# Patient Record
Sex: FEMALE | Race: WHITE | NOT HISPANIC OR LATINO | Employment: FULL TIME | URBAN - METROPOLITAN AREA
[De-identification: names, ages, dates, MRNs, and addresses within clinical notes are randomized per-mention and may not be internally consistent; named-entity substitution may affect disease eponyms.]

---

## 2018-07-18 LAB
ABO + RH BLD: NORMAL
ABO + RH BLD: NORMAL
BLD GP AB SCN SERPL QL: NORMAL
C TRACH DNA SPEC QL PROBE+SIG AMP: NORMAL
CHLAMYDIA - HIM PATIENT REPORTED: NEGATIVE
HBV SURFACE AG SERPL QL IA: NORMAL
HIV 1+2 AB+HIV1 P24 AG SERPL QL IA: NORMAL
N GONORRHOEA DNA SPEC QL PROBE+SIG AMP: NORMAL
RUBELLA ANTIBODY IGG QUANTITATIVE: NORMAL IU/ML

## 2018-09-11 ENCOUNTER — TRANSFERRED RECORDS (OUTPATIENT)
Dept: HEALTH INFORMATION MANAGEMENT | Facility: CLINIC | Age: 38
End: 2018-09-11

## 2018-09-17 ENCOUNTER — PRE VISIT (OUTPATIENT)
Dept: MATERNAL FETAL MEDICINE | Facility: CLINIC | Age: 38
End: 2018-09-17

## 2018-09-24 ENCOUNTER — HOSPITAL ENCOUNTER (OUTPATIENT)
Dept: ULTRASOUND IMAGING | Facility: CLINIC | Age: 38
Discharge: HOME | End: 2018-09-24
Attending: OBSTETRICS & GYNECOLOGY | Admitting: OBSTETRICS & GYNECOLOGY
Payer: COMMERCIAL

## 2018-09-24 ENCOUNTER — OFFICE VISIT (OUTPATIENT)
Dept: MATERNAL FETAL MEDICINE | Facility: CLINIC | Age: 38
End: 2018-09-24
Attending: OBSTETRICS & GYNECOLOGY
Payer: COMMERCIAL

## 2018-09-24 DIAGNOSIS — O26.90 PREGNANCY RELATED CONDITION, ANTEPARTUM: ICD-10-CM

## 2018-09-24 DIAGNOSIS — O09.522 ELDERLY MULTIGRAVIDA IN SECOND TRIMESTER: Primary | ICD-10-CM

## 2018-09-24 DIAGNOSIS — O09.512 SUPERVISION OF ELDERLY PRIMIGRAVIDA IN SECOND TRIMESTER: Primary | ICD-10-CM

## 2018-09-24 PROCEDURE — 40000072 ZZH STATISTIC GENETIC COUNSELING, < 16 MIN: Mod: ZF | Performed by: GENETIC COUNSELOR, MS

## 2018-09-24 PROCEDURE — 76811 OB US DETAILED SNGL FETUS: CPT

## 2018-09-24 NOTE — MR AVS SNAPSHOT
"              After Visit Summary   2018    Bonnie Welch    MRN: [id number]           Patient Information     Date Of Birth          1980        Visit Information        Provider Department      2018 8:45 AM Mary Nichols [facility]        Today's Diagnoses     Supervision of elderly primigravida in second trimester    -  1    Pregnancy related condition, antepartum           Follow-ups after your visit        Who to contact     If you have questions or need follow up information about today's clinic visit or your schedule please contact [facility]ERNAL FETAL [facility]IC[facility] directly at [phone].  Normal or non-critical lab and imaging results will be communicated to you by [employer], letter or phone within 4 business days after the clinic has received the results. If you do not hear from us within 7 days, please contact the clinic through [employer] or phone. If you have a critical or abnormal lab result, we will notify you by phone as soon as possible.  Submit refill requests through tripJane or call your pharmacy and they will forward the refill request to us. Please allow 3 business days for your refill to be completed.          Additional Information About Your Visit        MyChart Information     tripJane lets you send messages to your doctor, view your test results, renew your prescriptions, schedule appointments and more. To sign up, go to [email or web address] . Click on \"Log in\" on the left side of the screen, which will take you to the Welcome page. Then click on \"Sign up Now\" on the right side of the page.     You will be asked to enter the access code listed below, as well as some personal information. Please follow the directions to create your username and password.     Your access code is: [email or web address]  Expires: 2018 10:20 AM     Your access code will  in 90 days. If you need help or a new code, please call your [facility] " clinic or [phone].        Care EveryWhere ID     This is your Care EveryWhere ID. This could be used by other organizations to access your [facility] medical records  [id number]        Your Vitals Were     Last Period                   05/21/2018            Blood Pressure from Last 3 Encounters:   No data found for BP    Weight from Last 3 Encounters:   No data found for Wt              We Performed the Following     [facility] Genetic Counseling        Primary Care Provider Office Phone # Fax #    Laura Irizarry [phone]       [facility] [address]  [place] [address]        Equal Access to Services     [facility]: Hadii aad ku hadasho Soomaali, waaxda luqadaha, qaybta kaalmada adeegyada, emilie canas . So [facility] [phone].    ATENCIÓN: Si habla español, tiene a villasenor disposición servicios gratuitos de asistencia lingüística. Llame al [phone].    We comply with applicable federal civil rights laws and [place] laws. We do not discriminate on the basis of race, color, national origin, age, disability, sex, sexual orientation, or gender identity.            Thank you!     Thank you for choosing [facility]  for your care. Our goal is always to provide you with excellent care. Hearing back from our patients is one way we can continue to improve our services. Please take a few minutes to complete the written survey that you may receive in the mail after your visit with us. Thank you!             Your Updated Medication List - Protect others around you: Learn how to safely use, store and throw away your medicines at www.disposemymeds.org.      Notice  As of 9/24/2018  3:22 PM    You have not been prescribed any medications.

## 2018-09-24 NOTE — PROGRESS NOTES
"Please see \"Imaging\" tab under \"Chart Review\" for details of today's US.      Janina Gregory, DO  Maternal-Fetal Medicine        "

## 2018-09-24 NOTE — PROGRESS NOTES
[facility] [place]  Genetic Counseling Consult    Patient:  Bonnie Welch YOB: 1980   Date of Service:  18      Bonnie Welch was seen at the [facility] [place] for genetic consultation as part of her appointment for comprehensive ultrasound.  The indication for genetic counseling is advanced maternal age. She was accompanied by her , Rashaun.        Impression/Plan:   1. Bonnie has not had serum screening in this pregnancy. Bonnie did have an NT measurement below the 95%ile.     2. Bonnie had a comprehensive (level II) ultrasound today.  Please see the ultrasound report for further details.    3. The patient declines genetic amniocentesis and additional maternal serum screening today.    Pregnancy History:   /Parity:      Age at Delivery: 39 year old  OLI: 2019, by Last Menstrual Period  Gestational Age: 18w0d    No significant complications or exposures were reported in the current pregnancy.    Medical History:   Bonnie s reported medical history is not expected to impact pregnancy management or risks to fetal development. Bonnie has a history of kidney stones. Bonnie mentions she is drinking a lot of water in an attempt to prevent them during pregnancy.        Family History:   A three-generation pedigree was obtained, and is scanned under the  Media  tab.   The following significant findings were reported by Bonnie:    The father of the pregnancy, Rashaun, 37, is healthy    Bonnie's mother has Bipolar disorder. Bonnie's maternal grandmother also had manic depression    Rashaun has little contact with three half-siblings but believes they are well    Rashaun's father  from pancreatic or prostate cancer at 73     Bipolar disorder is a mental health disorder inherited in a multifactorial fashion, meaning many factors are involved in the development of this condition. These factors usually include both  genetic and environmental aspects and a combination of these aspects lead to the condition. We discussed that family studies have provided estimated risks to individuals with a first or second degree relative with bipolar disorder. It has been estimated that when a parent has bipolar disorder there is a 5-30% chance for their child to have bipolar disorder. Bonnie explained she was not concerned about this.     Otherwise, the reported family history is negative for multiple miscarriages, stillbirths, birth defects, mental retardation, known genetic conditions, and consanguinity.       Carrier Screening:   The patient reports that she and the father of the pregnancy have  ancestry:      Cystic fibrosis is an autosomal recessive genetic condition that occurs with increased frequency in individuals of  ancestry and carrier screening for this condition is available.  In addition,  screening in the [facility] includes cystic fibrosis.      Expanded carrier screening for mutations in a large panel of genes associated with autosomal recessive conditions including cystic fibrosis, spinal muscular atrophy, and others, is now available.      The patient has declined the carrier screening options reviewed today.      Rashaun does have a grandparent with some Moravian ancestry but he is not aware specifically of Ashkenazi Moravian ancestry.        Risk Assessment for Chromosome Conditions:   We explained that the risk for fetal chromosome abnormalities increases with maternal age. We discussed specific features of common chromosome abnormalities, including Down syndrome, trisomy 13, trisomy 18, and sex chromosome trisomies.      At age 39 at midtrimester, the risk to have a baby with Down syndrome is 1 in 129.     At age 39 at midtrimester, the risk to have a baby with any chromosome abnormality is 1 in 65.       Bonnie did not have maternal serum screening earlier in pregnancy.    First trimester  screening    First trimester ultrasound with nuchal translucency and nasal bone assessments    Screening values were as follows: Nuchal translucency= 1.8 mm at 63.1 CRL performed at [facility]    Biochemical portion of first trimester screening was not done. While a normal NT is reassuring there is not an updated risk for Down syndrome and Trisomy 13/18 provided from this screen.     Testing Options:   We discussed the following options:   Non-invasive Prenatal Testing (NIPT)    Maternal plasma cell-free DNA testing; first trimester ultrasound with nuchal translucency and nasal bone assessment is recommended, when appropriate    Screens for fetal trisomy 21, trisomy 13, trisomy 18, and sex chromosome aneuploidy    Cannot screen for open neural tube defects; maternal serum AFP after 15 weeks is recommended     Genetic Amniocentesis    Invasive procedure typically performed in the second trimester by which amniotic fluid is obtained for the purpose of chromosome analysis and/or other prenatal genetic analysis    Diagnostic results; >99% sensitivity for fetal chromosome abnormalities    AFAFP measurement tests for open neural tube defects     Comprehensive (Level II) ultrasound: Detailed ultrasound performed between 18-22 weeks gestation to screen for major birth defects  and markers for aneuploidy.    We reviewed the benefits and limitations of this testing.  Screening tests provide a risk assessment specific to the pregnancy for certain fetal chromosome abnormalities, but cannot definitively diagnose or exclude a fetal chromosome abnormality.  Follow-up genetic counseling and consideration of diagnostic testing is recommended with any abnormal screening result.     Diagnostic tests carry inherent risks- including risk of miscarriage- that require careful consideration.  These tests can detect fetal chromosome abnormalities with greater than 99% certainty.  Results can be compromised by maternal cell  contamination or mosaicism, and are limited by the resolution of cytogenetic G-banding technology.  There is no screening nor diagnostic test that can detect all forms of birth defects or mental disability.    It was a pleasure to be involved with Bonnie s care. Face-to-face time of the meeting was 45 minutes.      Mary Nichols MS, [facility]  Genetic Counselor Intern  [facility]   Maternal Fetal Medicine Centers  [email or web address]   Office: [phone]   Pager: [phone] Fax: [phone]    Patient seen, evaluated and discussed with the Genetic Counseling Intern. I have verified the content of the note, which accurately reflects my assessment of the patient and the plan of care.  Supervising Genetic Counselor  Bianca Somers MS, [facility]  Maternal Fetal Medicine  [facility]  Phone: [phone]  Email: [email or web address]

## 2019-01-11 ENCOUNTER — APPOINTMENT (OUTPATIENT)
Dept: ULTRASOUND IMAGING | Facility: CLINIC | Age: 39
End: 2019-01-11
Payer: COMMERCIAL

## 2019-01-11 ENCOUNTER — HOSPITAL ENCOUNTER (INPATIENT)
Facility: CLINIC | Age: 39
LOS: 5 days | Discharge: HOME | End: 2019-01-16
Attending: OBSTETRICS & GYNECOLOGY | Admitting: OBSTETRICS & GYNECOLOGY
Payer: COMMERCIAL

## 2019-01-11 PROBLEM — O42.919 PRETERM PREMATURE RUPTURE OF MEMBRANES (PPROM) WITH UNKNOWN ONSET OF LABOR: Status: ACTIVE | Noted: 2019-01-11

## 2019-01-11 LAB
ABO + RH BLD: NORMAL
ABO + RH BLD: NORMAL
ALBUMIN UR-MCNC: 10 MG/DL
APPEARANCE UR: CLEAR
BACTERIA #/AREA URNS HPF: ABNORMAL /HPF
BASOPHILS # BLD AUTO: 0 10E9/L (ref 0–0.2)
BASOPHILS NFR BLD AUTO: 0.1 %
BILIRUB UR QL STRIP: NEGATIVE
BLD GP AB SCN SERPL QL: NORMAL
BLOOD BANK CMNT PATIENT-IMP: NORMAL
COLOR UR AUTO: YELLOW
DIFFERENTIAL METHOD BLD: ABNORMAL
EOSINOPHIL # BLD AUTO: 0 10E9/L (ref 0–0.7)
EOSINOPHIL NFR BLD AUTO: 0.2 %
ERYTHROCYTE [DISTWIDTH] IN BLOOD BY AUTOMATED COUNT: 12.8 % (ref 10–15)
GLUCOSE UR STRIP-MCNC: NEGATIVE MG/DL
HCT VFR BLD AUTO: 35 % (ref 35–47)
HGB BLD-MCNC: 12.3 G/DL (ref 11.7–15.7)
HGB UR QL STRIP: NEGATIVE
IMM GRANULOCYTES # BLD: 0 10E9/L (ref 0–0.4)
IMM GRANULOCYTES NFR BLD: 0.2 %
KETONES UR STRIP-MCNC: NEGATIVE MG/DL
LEUKOCYTE ESTERASE UR QL STRIP: ABNORMAL
LYMPHOCYTES # BLD AUTO: 1.1 10E9/L (ref 0.8–5.3)
LYMPHOCYTES NFR BLD AUTO: 10.1 %
MCH RBC QN AUTO: 31 PG (ref 26.5–33)
MCHC RBC AUTO-ENTMCNC: 35.1 G/DL (ref 31.5–36.5)
MCV RBC AUTO: 88 FL (ref 78–100)
MONOCYTES # BLD AUTO: 0.3 10E9/L (ref 0–1.3)
MONOCYTES NFR BLD AUTO: 3.2 %
MUCOUS THREADS #/AREA URNS LPF: PRESENT /LPF
NEUTROPHILS # BLD AUTO: 9.1 10E9/L (ref 1.6–8.3)
NEUTROPHILS NFR BLD AUTO: 86.2 %
NITRATE UR QL: NEGATIVE
PH UR STRIP: 6 PH (ref 5–7)
PLATELET # BLD AUTO: 319 10E9/L (ref 150–450)
RBC # BLD AUTO: 3.97 10E12/L (ref 3.8–5.2)
RBC #/AREA URNS AUTO: 2 /HPF (ref 0–2)
RUPTURE OF FETAL MEMBRANES BY ROM PLUS: POSITIVE
SOURCE: ABNORMAL
SP GR UR STRIP: 1.01 (ref 1–1.03)
SPECIMEN EXP DATE BLD: NORMAL
SQUAMOUS #/AREA URNS AUTO: 1 /HPF (ref 0–1)
UROBILINOGEN UR STRIP-MCNC: NORMAL MG/DL (ref 0–2)
WBC # BLD AUTO: 10.6 10E9/L (ref 4–11)
WBC #/AREA URNS AUTO: 5 /HPF (ref 0–5)

## 2019-01-11 PROCEDURE — 86901 BLOOD TYPING SEROLOGIC RH(D): CPT | Performed by: OBSTETRICS & GYNECOLOGY

## 2019-01-11 PROCEDURE — 86850 RBC ANTIBODY SCREEN: CPT | Performed by: OBSTETRICS & GYNECOLOGY

## 2019-01-11 PROCEDURE — 36415 COLL VENOUS BLD VENIPUNCTURE: CPT | Performed by: OBSTETRICS & GYNECOLOGY

## 2019-01-11 PROCEDURE — 59025 FETAL NON-STRESS TEST: CPT

## 2019-01-11 PROCEDURE — 81001 URINALYSIS AUTO W/SCOPE: CPT | Performed by: OBSTETRICS & GYNECOLOGY

## 2019-01-11 PROCEDURE — 84112 EVAL AMNIOTIC FLUID PROTEIN: CPT | Performed by: OBSTETRICS & GYNECOLOGY

## 2019-01-11 PROCEDURE — 25000132 ZZH RX MED GY IP 250 OP 250 PS 637: Performed by: OBSTETRICS & GYNECOLOGY

## 2019-01-11 PROCEDURE — 12000000 ZZH R&B MED SURG/OB

## 2019-01-11 PROCEDURE — 86900 BLOOD TYPING SEROLOGIC ABO: CPT | Performed by: OBSTETRICS & GYNECOLOGY

## 2019-01-11 PROCEDURE — 25000128 H RX IP 250 OP 636

## 2019-01-11 PROCEDURE — 76816 OB US FOLLOW-UP PER FETUS: CPT

## 2019-01-11 PROCEDURE — 76819 FETAL BIOPHYS PROFIL W/O NST: CPT

## 2019-01-11 PROCEDURE — G0463 HOSPITAL OUTPT CLINIC VISIT: HCPCS | Mod: 25

## 2019-01-11 PROCEDURE — 87653 STREP B DNA AMP PROBE: CPT | Performed by: OBSTETRICS & GYNECOLOGY

## 2019-01-11 PROCEDURE — 85025 COMPLETE CBC W/AUTO DIFF WBC: CPT | Performed by: OBSTETRICS & GYNECOLOGY

## 2019-01-11 PROCEDURE — 25000128 H RX IP 250 OP 636: Performed by: OBSTETRICS & GYNECOLOGY

## 2019-01-11 RX ORDER — BETAMETHASONE SODIUM PHOSPHATE AND BETAMETHASONE ACETATE 3; 3 MG/ML; MG/ML
12 INJECTION, SUSPENSION INTRA-ARTICULAR; INTRALESIONAL; INTRAMUSCULAR; SOFT TISSUE EVERY 24 HOURS
Status: COMPLETED | OUTPATIENT
Start: 2019-01-11 | End: 2019-01-12

## 2019-01-11 RX ORDER — ONDANSETRON 2 MG/ML
4 INJECTION INTRAMUSCULAR; INTRAVENOUS EVERY 6 HOURS PRN
Status: DISCONTINUED | OUTPATIENT
Start: 2019-01-11 | End: 2019-01-16 | Stop reason: HOSPADM

## 2019-01-11 RX ORDER — AMPICILLIN 2 G/1
2 INJECTION, POWDER, FOR SOLUTION INTRAVENOUS EVERY 6 HOURS
Status: COMPLETED | OUTPATIENT
Start: 2019-01-11 | End: 2019-01-13

## 2019-01-11 RX ORDER — LIDOCAINE 40 MG/G
CREAM TOPICAL
Status: DISCONTINUED | OUTPATIENT
Start: 2019-01-11 | End: 2019-01-16 | Stop reason: HOSPADM

## 2019-01-11 RX ORDER — BETAMETHASONE SODIUM PHOSPHATE AND BETAMETHASONE ACETATE 3; 3 MG/ML; MG/ML
INJECTION, SUSPENSION INTRA-ARTICULAR; INTRALESIONAL; INTRAMUSCULAR; SOFT TISSUE
Status: COMPLETED
Start: 2019-01-11 | End: 2019-01-11

## 2019-01-11 RX ORDER — AMOXICILLIN 250 MG/1
250 CAPSULE ORAL 3 TIMES DAILY
Status: DISCONTINUED | OUTPATIENT
Start: 2019-01-13 | End: 2019-01-16 | Stop reason: HOSPADM

## 2019-01-11 RX ORDER — DOCUSATE SODIUM 100 MG/1
100 CAPSULE, LIQUID FILLED ORAL 2 TIMES DAILY
Status: DISCONTINUED | OUTPATIENT
Start: 2019-01-11 | End: 2019-01-16 | Stop reason: HOSPADM

## 2019-01-11 RX ORDER — AMPICILLIN 2 G/1
2 INJECTION, POWDER, FOR SOLUTION INTRAVENOUS EVERY 6 HOURS
Status: DISCONTINUED | OUTPATIENT
Start: 2019-01-11 | End: 2019-01-11

## 2019-01-11 RX ORDER — MULTIPLE VITAMINS W/ MINERALS TAB 9MG-400MCG
1 TAB ORAL DAILY
COMMUNITY

## 2019-01-11 RX ORDER — ONDANSETRON 2 MG/ML
4 INJECTION INTRAMUSCULAR; INTRAVENOUS EVERY 6 HOURS PRN
Status: DISCONTINUED | OUTPATIENT
Start: 2019-01-11 | End: 2019-01-11

## 2019-01-11 RX ORDER — AZITHROMYCIN 250 MG/1
1000 TABLET, FILM COATED ORAL ONCE
Status: COMPLETED | OUTPATIENT
Start: 2019-01-11 | End: 2019-01-11

## 2019-01-11 RX ORDER — AMOXICILLIN 250 MG/1
250 CAPSULE ORAL EVERY 8 HOURS SCHEDULED
Status: DISCONTINUED | OUTPATIENT
Start: 2019-01-13 | End: 2019-01-11

## 2019-01-11 RX ORDER — DIPHENHYDRAMINE HCL 25 MG
50 CAPSULE ORAL
Status: DISCONTINUED | OUTPATIENT
Start: 2019-01-11 | End: 2019-01-16 | Stop reason: HOSPADM

## 2019-01-11 RX ADMIN — AMPICILLIN SODIUM 2 G: 2 INJECTION, POWDER, FOR SOLUTION INTRAMUSCULAR; INTRAVENOUS at 13:34

## 2019-01-11 RX ADMIN — AMPICILLIN SODIUM 2 G: 2 INJECTION, POWDER, FOR SOLUTION INTRAMUSCULAR; INTRAVENOUS at 07:28

## 2019-01-11 RX ADMIN — BETAMETHASONE ACETATE AND BETAMETHASONE SODIUM PHOSPHATE 12 MG: 3; 3 INJECTION, SUSPENSION INTRA-ARTICULAR; INTRALESIONAL; INTRAMUSCULAR; SOFT TISSUE at 05:50

## 2019-01-11 RX ADMIN — AMPICILLIN SODIUM 2 G: 2 INJECTION, POWDER, FOR SOLUTION INTRAMUSCULAR; INTRAVENOUS at 19:44

## 2019-01-11 RX ADMIN — AZITHROMYCIN 1000 MG: 250 TABLET, FILM COATED ORAL at 07:28

## 2019-01-11 RX ADMIN — BETAMETHASONE SODIUM PHOSPHATE AND BETAMETHASONE ACETATE 12 MG: 3; 3 INJECTION, SUSPENSION INTRA-ARTICULAR; INTRALESIONAL; INTRAMUSCULAR at 05:50

## 2019-01-11 ASSESSMENT — MIFFLIN-ST. JEOR: SCORE: 1273.28

## 2019-01-11 NOTE — PROGRESS NOTES
S: Feeling well. Had mild cramping earlier, relieved after urinating. Continues to leak clear/yellow fluid, but decreased. No vaginal bleeding. No fevers/chills.    O: Temp: 98.4  F (36.9  C) Temp src: Temporal BP: 119/73     Resp: (P) 16           Gen: NAD  Abd: Soft, gravid, no fundal tenderness  Speculum exam: +pooling of clear fluid. Cervix appears visually 0.5 cm dilated. Fetal hair visualized.     FHT: 140/mod/+accels/no decels  [place]: rare contractions    A/P 39 y/o  at 33w4d with PPROM    Fetal well being - Cat 1 tracing, currently on continuous fetal monitoring. Would continue for total of 24 hours then space to TID monitoring if reassuring  -BMTZ first dose given this AM, next due tomorrow AM  -No neuro-protection needed as > 32 weeks  -Cephalic presentation, confirmed by US today. EFW 2329 grams  -NICU team to consult today    PPROM  -Continue latency antibiotics  -No signs of infection or labor. Continue to monitor  -GBS pending. Urine culture pending. WBC normal  -Plan for induction of labor at 34 weeks if undelivered before then.    Rh+    Dispo - admission for remainder of pregnancy/delivery    Katie Oropeza MD 19 11:59 AM

## 2019-01-11 NOTE — PLAN OF CARE
"Patient presents to maternal assessment at 0435 with complaints of \"water broke\" at around 0400.  Woke up and felt leaking of fluid, clear.  Denies bleeding Reports positive fetal movement     EFM and TOCO applied with patient's consent, history reviewed  Consent for ROM plus, awaiting results.  0500:  Dr. Schwab at bedside to see patient.  "

## 2019-01-11 NOTE — PLAN OF CARE
0720: Cares assumed. Pt states no new complaints. Assessments done. Plan of care discussed. Questions answered. Pt verbalizes understanding of when to call her nurse.

## 2019-01-11 NOTE — H&P
"  2019    Bonnie Welch  [id number]            OB Admit History & Physical      Ms. Welch  is here with PPROM at 33 weeks 4 days    She woke from sleep and felt leakage of fluid, which has continued. Fluid is clear.  PPROM confirmed with positive ROM plus.    Patient's last menstrual period was 2018.   Her Estimated Date of Delivery: 2019  , making her 33w4d  wks.      Estimated body mass index is 22.83 kg/m  as calculated from the following:    Height as of this encounter: 1.626 m (5' 4\").    Weight as of this encounter: 60.3 kg (133 lb).  Her prenatal course has been  complicated by   Advanced Maternal Age: declined NIPT. NT <95th% and Normal level II sono    See prenatal for labs.  unknown GBBS, Rubella  Immune, RH positive    Estimated fetal weight= ultrasound pending       She is a 38 year old   Her OB history:   Obstetric History       T0      L0     SAB0   TAB0   Ectopic0   Multiple0   Live Births0       # Outcome Date GA Lbr Morales/2nd Weight Sex Delivery Anes PTL Lv   2 Current            1                         Past Medical History:   Diagnosis Date     Kidney stones      &         No past surgical history on file.      No current outpatient medications on file.       Allergies: Aspirin and Ibuprofen      REVIEW OF SYSTEMS:  NEUROLOGIC:  Negative  EYES:  Negative  ENT:  Negative  GI:  Negative  BREAST:  Negative  :  Negative  GYN:  Negative  CV:  Negative  PULMONARY:  Negative  MUSCULOSKELETAL:  Negative  PSYCH:  Negative        Social History     Socioeconomic History     Marital status:      Spouse name: Not on file     Number of children: Not on file     Years of education: Not on file     Highest education level: Not on file   Social Needs     Financial resource strain: Not on file     Food insecurity - worry: Not on file     Food insecurity - inability: Not on file     Transportation needs - medical: Not on file     " Transportation needs - non-medical: Not on file   Occupational History     Not on file   Tobacco Use     Smoking status: Not on file   Substance and Sexual Activity     Alcohol use: Not on file     Drug use: Not on file     Sexual activity: Not on file   Other Topics Concern     Not on file   Social History Narrative     Not on file      No family history on file.          Vitals:   FHT category I. Baseline 135 moderate variability + accelerations, no decelerations.  With rare contraction on toco    Alert Awake in NAD  HEENT grossly normal  Neck: no lymphadenopathy or thryoidomegaly  Lungs CTAB  Back No spinal or CVAT  Heart RRR  ABD gravid, nontender on exam with vertex palpable  Pelvic:  clear fluid noted, no blood noted  Cervical exam deferred  EXT:  no edema or calf tenderness  Neuro:  Grossly intact    Assessment:  IUP at 33w4d  With PPROM.  Currently no signs of  labor.    Plan:  Admit to Labor and Delivery  Betamethasone IM q 24h x 2 doses for fetal lung maturity  Antibiotics for latency: IV Ampicillin and Arithromycin x 48 hours, followed by oral Amoxicillin   Send CBC, UA/Ucx and GBS to assess for infection  OB ultrasound today to confirm vertex presentation, EFW, BPP  NICU consulted  Anticipate possible delivery at 34 weeks       Jennifer L. Schwab MD  Dept of OB/GYN  2019

## 2019-01-11 NOTE — PLAN OF CARE
0820: Pt complains of feeling crampy. Encouraged to empty her bladder.Pt states she didn't feel anymore cramps after voiding.

## 2019-01-11 NOTE — PLAN OF CARE
Order received to admit to labor and delivery  Plan reviewed with patient who verbalizes understanding

## 2019-01-12 LAB
GP B STREP DNA SPEC QL NAA+PROBE: NEGATIVE
SPECIMEN SOURCE: NORMAL

## 2019-01-12 PROCEDURE — 25000128 H RX IP 250 OP 636: Performed by: OBSTETRICS & GYNECOLOGY

## 2019-01-12 PROCEDURE — 12000000 ZZH R&B MED SURG/OB

## 2019-01-12 RX ADMIN — AMPICILLIN SODIUM 2 G: 2 INJECTION, POWDER, FOR SOLUTION INTRAMUSCULAR; INTRAVENOUS at 07:40

## 2019-01-12 RX ADMIN — BETAMETHASONE ACETATE AND BETAMETHASONE SODIUM PHOSPHATE 12 MG: 3; 3 INJECTION, SUSPENSION INTRA-ARTICULAR; INTRALESIONAL; INTRAMUSCULAR; SOFT TISSUE at 05:50

## 2019-01-12 RX ADMIN — AMPICILLIN SODIUM 2 G: 2 INJECTION, POWDER, FOR SOLUTION INTRAMUSCULAR; INTRAVENOUS at 13:28

## 2019-01-12 RX ADMIN — AMPICILLIN SODIUM 2 G: 2 INJECTION, POWDER, FOR SOLUTION INTRAMUSCULAR; INTRAVENOUS at 19:45

## 2019-01-12 RX ADMIN — AMPICILLIN SODIUM 2 G: 2 INJECTION, POWDER, FOR SOLUTION INTRAMUSCULAR; INTRAVENOUS at 01:42

## 2019-01-12 NOTE — PLAN OF CARE
1930- Cares assumed.   0105- Fetal Deceleration present x1, duration of 90 seconds, goes down to 80 bpm with moderate variability, RN at bedside, pt sleeping, repositioned patient, decel returned to baseline, pt denies contractions or cramping. Will continue to monitor.     0550- 2nd dose of Beta given. Pt rested well throughout the night, denies feeling any contractions, or cramping, Leaking scant clear fluid, temps and vitals stable. FHT's reassuring.    Plan is to continue with Antibiotics. Dr. Encarnacion in department, reviewed strip and per MD wu to move to TID monitoring.     0715- Report given to Tamica WASSERMAN.

## 2019-01-12 NOTE — PROGRESS NOTES
33 5/7 wks  PPROM    No complaints. Not aware of any contractions. Leaking only small amount of fluid.  Had second betamethasone this AM.    97.6   108/60    NST reactive today.  One deceleration all night, still had good variability, variable type deceleration.    IUP 33 5/7 wks  PPROM  Stable, no evidence of infection    Continue watching closely. OK for tid monitoring.  Continue antibiotics    Plan delivery on Monday at 34 weeks if remains stable

## 2019-01-12 NOTE — CONSULTS
Neonatology Antepartum Counseling Consult      I was asked to provide antepartum counseling for Bonnie Welch at the request of Schwab, Jennifer Lani, MD secondary to  labor. Ms. Stephens is currently 33 4/7 weeks and has a hx significant for  rupture of membranes. Betamethasone was administered on  & . Ms. Welch, accompanied by her , was counseled on the expected hospital course, potential risks, and outcomes associated with an infant born at approximately 33 4/7 weeks gestation. The counseling included: morbidity, mortality, initial delivery room stabilization, respiratory course, lung development, patent ductus arteriosus, retinopathy of prematurity, hyperbilirubinemia, hemodynamic support, infection (including NEC), intraventricular hemorrhage, nutrition, growth and development, and long term outcomes. Please feel free to call with any additional questions or concerns.          Face to Face Time (min): 20  Total Time (minutes): 20  DILSHAD Fuentes, CNP 2019 9:10 PM

## 2019-01-12 NOTE — PROGRESS NOTES
Visited with pt    Minimal leaking @ present    No regular or painful contractions    FHT Category 1, reassuring  (normal baseline)    >>>33 week 4 day nullipara  PPROM early this morning    Not in labor  Fetal status reassuring  No evidence of intra-amniotic infection    Beta-methasone #2 due early tomorrow morning    Continuing with latency antibiotics    Reviewed plan for delivery (induction of labor) Monday January 14 (34 weeks gestation)    Rusty WATTS

## 2019-01-13 PROCEDURE — 3E033VJ INTRODUCTION OF OTHER HORMONE INTO PERIPHERAL VEIN, PERCUTANEOUS APPROACH: ICD-10-PCS | Performed by: OBSTETRICS & GYNECOLOGY

## 2019-01-13 PROCEDURE — 25000128 H RX IP 250 OP 636: Performed by: OBSTETRICS & GYNECOLOGY

## 2019-01-13 PROCEDURE — 12000000 ZZH R&B MED SURG/OB

## 2019-01-13 PROCEDURE — 25000132 ZZH RX MED GY IP 250 OP 250 PS 637: Performed by: OBSTETRICS & GYNECOLOGY

## 2019-01-13 RX ORDER — LIDOCAINE 40 MG/G
CREAM TOPICAL
Status: DISCONTINUED | OUTPATIENT
Start: 2019-01-13 | End: 2019-01-15

## 2019-01-13 RX ORDER — OXYTOCIN/0.9 % SODIUM CHLORIDE 30/500 ML
1-24 PLASTIC BAG, INJECTION (ML) INTRAVENOUS CONTINUOUS
Status: DISCONTINUED | OUTPATIENT
Start: 2019-01-14 | End: 2019-01-15

## 2019-01-13 RX ORDER — HYDROXYZINE HYDROCHLORIDE 50 MG/1
50 TABLET, FILM COATED ORAL
Status: DISCONTINUED | OUTPATIENT
Start: 2019-01-13 | End: 2019-01-16 | Stop reason: HOSPADM

## 2019-01-13 RX ADMIN — AMOXICILLIN 250 MG: 250 CAPSULE ORAL at 14:06

## 2019-01-13 RX ADMIN — DOCUSATE SODIUM 100 MG: 100 CAPSULE, LIQUID FILLED ORAL at 22:05

## 2019-01-13 RX ADMIN — DIPHENHYDRAMINE HYDROCHLORIDE 50 MG: 25 CAPSULE ORAL at 22:05

## 2019-01-13 RX ADMIN — AMOXICILLIN 250 MG: 250 CAPSULE ORAL at 07:00

## 2019-01-13 RX ADMIN — AMOXICILLIN 250 MG: 250 CAPSULE ORAL at 22:05

## 2019-01-13 RX ADMIN — AMPICILLIN SODIUM 2 G: 2 INJECTION, POWDER, FOR SOLUTION INTRAMUSCULAR; INTRAVENOUS at 01:43

## 2019-01-13 NOTE — PLAN OF CARE
1930- Bedside report received, cares assumed.     Patient rested well throughout the night. Denies pain or feeling any contractions. IV antibiotics completed. Leaking scant clear fluid, Afebrile. Cat 1. Tracing.      PO antibitoic Amoxicillin 1st dose given at 0700.    0715 -Bedside report given to Tamica WASSERMAN.

## 2019-01-13 NOTE — PROGRESS NOTES
33 weeks 6 days  EDC February 25    Good FM  Some ongoing leaking  No regular or painful contractions  No vaginal bleeding    Afebrile  Normotensive    NST reactive  toco quiet    abd NT    GBS PCR negative    A/p    33 week 6 day nullipara  PPROM January 11    Pt has received beta-methasone x 2    Stable; maternal & fetal statuses reassuring    Not in labor; no evidence of intra-amniotic infection    Plan for induction of labor tomorrow (January 14; 34 weeks gestation)  Will plan intravenous oxytocin; disc nature of induction @ nakul Ojeda MD

## 2019-01-14 ENCOUNTER — ANESTHESIA EVENT (OUTPATIENT)
Dept: OBGYN | Facility: CLINIC | Age: 39
End: 2019-01-14
Payer: COMMERCIAL

## 2019-01-14 ENCOUNTER — ANESTHESIA (OUTPATIENT)
Dept: OBGYN | Facility: CLINIC | Age: 39
End: 2019-01-14
Payer: COMMERCIAL

## 2019-01-14 LAB
ABO + RH BLD: NORMAL
ABO + RH BLD: NORMAL
BLD GP AB SCN SERPL QL: NORMAL
BLOOD BANK CMNT PATIENT-IMP: NORMAL
ERYTHROCYTE [DISTWIDTH] IN BLOOD BY AUTOMATED COUNT: 12.7 % (ref 10–15)
HCT VFR BLD AUTO: 33.5 % (ref 35–47)
HGB BLD-MCNC: 11.4 G/DL (ref 11.7–15.7)
MCH RBC QN AUTO: 30.8 PG (ref 26.5–33)
MCHC RBC AUTO-ENTMCNC: 34 G/DL (ref 31.5–36.5)
MCV RBC AUTO: 91 FL (ref 78–100)
PLATELET # BLD AUTO: 281 10E9/L (ref 150–450)
RBC # BLD AUTO: 3.7 10E12/L (ref 3.8–5.2)
SPECIMEN EXP DATE BLD: NORMAL
WBC # BLD AUTO: 11.9 10E9/L (ref 4–11)

## 2019-01-14 PROCEDURE — 88307 TISSUE EXAM BY PATHOLOGIST: CPT | Mod: 26 | Performed by: OBSTETRICS & GYNECOLOGY

## 2019-01-14 PROCEDURE — 12000000 ZZH R&B MED SURG/OB

## 2019-01-14 PROCEDURE — 25000128 H RX IP 250 OP 636: Performed by: ANESTHESIOLOGY

## 2019-01-14 PROCEDURE — 00HU33Z INSERTION OF INFUSION DEVICE INTO SPINAL CANAL, PERCUTANEOUS APPROACH: ICD-10-PCS | Performed by: ANESTHESIOLOGY

## 2019-01-14 PROCEDURE — 86850 RBC ANTIBODY SCREEN: CPT | Performed by: OBSTETRICS & GYNECOLOGY

## 2019-01-14 PROCEDURE — 27110038 ZZH RX 271: Performed by: ANESTHESIOLOGY

## 2019-01-14 PROCEDURE — 0KQM0ZZ REPAIR PERINEUM MUSCLE, OPEN APPROACH: ICD-10-PCS | Performed by: OBSTETRICS & GYNECOLOGY

## 2019-01-14 PROCEDURE — 88307 TISSUE EXAM BY PATHOLOGIST: CPT | Performed by: OBSTETRICS & GYNECOLOGY

## 2019-01-14 PROCEDURE — 72200001 ZZH LABOR CARE VAGINAL DELIVERY SINGLE

## 2019-01-14 PROCEDURE — 37000011 ZZH ANESTHESIA WARD SERVICE

## 2019-01-14 PROCEDURE — 3E0R3BZ INTRODUCTION OF ANESTHETIC AGENT INTO SPINAL CANAL, PERCUTANEOUS APPROACH: ICD-10-PCS | Performed by: ANESTHESIOLOGY

## 2019-01-14 PROCEDURE — 25000125 ZZHC RX 250: Performed by: OBSTETRICS & GYNECOLOGY

## 2019-01-14 PROCEDURE — 36415 COLL VENOUS BLD VENIPUNCTURE: CPT | Performed by: OBSTETRICS & GYNECOLOGY

## 2019-01-14 PROCEDURE — 85027 COMPLETE CBC AUTOMATED: CPT | Performed by: OBSTETRICS & GYNECOLOGY

## 2019-01-14 PROCEDURE — 25000132 ZZH RX MED GY IP 250 OP 250 PS 637: Performed by: OBSTETRICS & GYNECOLOGY

## 2019-01-14 PROCEDURE — 0UQMXZZ REPAIR VULVA, EXTERNAL APPROACH: ICD-10-PCS | Performed by: OBSTETRICS & GYNECOLOGY

## 2019-01-14 PROCEDURE — 86900 BLOOD TYPING SEROLOGIC ABO: CPT | Performed by: OBSTETRICS & GYNECOLOGY

## 2019-01-14 PROCEDURE — 86901 BLOOD TYPING SEROLOGIC RH(D): CPT | Performed by: OBSTETRICS & GYNECOLOGY

## 2019-01-14 RX ORDER — OXYTOCIN/0.9 % SODIUM CHLORIDE 30/500 ML
340 PLASTIC BAG, INJECTION (ML) INTRAVENOUS CONTINUOUS PRN
Status: DISCONTINUED | OUTPATIENT
Start: 2019-01-14 | End: 2019-01-16 | Stop reason: HOSPADM

## 2019-01-14 RX ORDER — ONDANSETRON 2 MG/ML
4 INJECTION INTRAMUSCULAR; INTRAVENOUS EVERY 6 HOURS PRN
Status: DISCONTINUED | OUTPATIENT
Start: 2019-01-14 | End: 2019-01-15

## 2019-01-14 RX ORDER — OXYTOCIN 10 [USP'U]/ML
10 INJECTION, SOLUTION INTRAMUSCULAR; INTRAVENOUS
Status: DISCONTINUED | OUTPATIENT
Start: 2019-01-14 | End: 2019-01-16 | Stop reason: HOSPADM

## 2019-01-14 RX ORDER — ACETAMINOPHEN 325 MG/1
650 TABLET ORAL EVERY 4 HOURS PRN
Status: DISCONTINUED | OUTPATIENT
Start: 2019-01-14 | End: 2019-01-16 | Stop reason: HOSPADM

## 2019-01-14 RX ORDER — BISACODYL 10 MG
10 SUPPOSITORY, RECTAL RECTAL DAILY PRN
Status: DISCONTINUED | OUTPATIENT
Start: 2019-01-16 | End: 2019-01-16 | Stop reason: HOSPADM

## 2019-01-14 RX ORDER — NALBUPHINE HYDROCHLORIDE 10 MG/ML
2.5-5 INJECTION, SOLUTION INTRAMUSCULAR; INTRAVENOUS; SUBCUTANEOUS EVERY 6 HOURS PRN
Status: DISCONTINUED | OUTPATIENT
Start: 2019-01-14 | End: 2019-01-15

## 2019-01-14 RX ORDER — NALOXONE HYDROCHLORIDE 0.4 MG/ML
.1-.4 INJECTION, SOLUTION INTRAMUSCULAR; INTRAVENOUS; SUBCUTANEOUS
Status: DISCONTINUED | OUTPATIENT
Start: 2019-01-14 | End: 2019-01-16 | Stop reason: HOSPADM

## 2019-01-14 RX ORDER — FENTANYL CITRATE 50 UG/ML
100 INJECTION, SOLUTION INTRAMUSCULAR; INTRAVENOUS ONCE
Status: COMPLETED | OUTPATIENT
Start: 2019-01-14 | End: 2019-01-14

## 2019-01-14 RX ORDER — FENTANYL CITRATE 50 UG/ML
50-100 INJECTION, SOLUTION INTRAMUSCULAR; INTRAVENOUS
Status: DISCONTINUED | OUTPATIENT
Start: 2019-01-14 | End: 2019-01-15

## 2019-01-14 RX ORDER — AMOXICILLIN 250 MG
2 CAPSULE ORAL 2 TIMES DAILY
Status: DISCONTINUED | OUTPATIENT
Start: 2019-01-14 | End: 2019-01-16 | Stop reason: HOSPADM

## 2019-01-14 RX ORDER — CARBOPROST TROMETHAMINE 250 UG/ML
250 INJECTION, SOLUTION INTRAMUSCULAR
Status: DISCONTINUED | OUTPATIENT
Start: 2019-01-14 | End: 2019-01-15

## 2019-01-14 RX ORDER — HYDROCORTISONE 2.5 %
CREAM (GRAM) TOPICAL 3 TIMES DAILY PRN
Status: DISCONTINUED | OUTPATIENT
Start: 2019-01-14 | End: 2019-01-16 | Stop reason: HOSPADM

## 2019-01-14 RX ORDER — AMOXICILLIN 250 MG
1 CAPSULE ORAL 2 TIMES DAILY
Status: DISCONTINUED | OUTPATIENT
Start: 2019-01-14 | End: 2019-01-16 | Stop reason: HOSPADM

## 2019-01-14 RX ORDER — ROPIVACAINE HYDROCHLORIDE 2 MG/ML
10 INJECTION, SOLUTION EPIDURAL; INFILTRATION; PERINEURAL ONCE
Status: COMPLETED | OUTPATIENT
Start: 2019-01-14 | End: 2019-01-14

## 2019-01-14 RX ORDER — EPHEDRINE SULFATE 50 MG/ML
5 INJECTION, SOLUTION INTRAMUSCULAR; INTRAVENOUS; SUBCUTANEOUS
Status: DISCONTINUED | OUTPATIENT
Start: 2019-01-14 | End: 2019-01-15

## 2019-01-14 RX ORDER — OXYTOCIN/0.9 % SODIUM CHLORIDE 30/500 ML
100-340 PLASTIC BAG, INJECTION (ML) INTRAVENOUS CONTINUOUS PRN
Status: COMPLETED | OUTPATIENT
Start: 2019-01-14 | End: 2019-01-14

## 2019-01-14 RX ORDER — METHYLERGONOVINE MALEATE 0.2 MG/ML
200 INJECTION INTRAVENOUS
Status: DISCONTINUED | OUTPATIENT
Start: 2019-01-14 | End: 2019-01-15

## 2019-01-14 RX ORDER — OXYTOCIN/0.9 % SODIUM CHLORIDE 30/500 ML
100 PLASTIC BAG, INJECTION (ML) INTRAVENOUS CONTINUOUS
Status: DISCONTINUED | OUTPATIENT
Start: 2019-01-14 | End: 2019-01-16 | Stop reason: HOSPADM

## 2019-01-14 RX ORDER — NALOXONE HYDROCHLORIDE 0.4 MG/ML
.1-.4 INJECTION, SOLUTION INTRAMUSCULAR; INTRAVENOUS; SUBCUTANEOUS
Status: DISCONTINUED | OUTPATIENT
Start: 2019-01-14 | End: 2019-01-15

## 2019-01-14 RX ORDER — IBUPROFEN 400 MG/1
800 TABLET, FILM COATED ORAL EVERY 6 HOURS PRN
Status: DISCONTINUED | OUTPATIENT
Start: 2019-01-14 | End: 2019-01-15

## 2019-01-14 RX ORDER — OXYCODONE HYDROCHLORIDE 5 MG/1
5 TABLET ORAL EVERY 4 HOURS PRN
Status: DISCONTINUED | OUTPATIENT
Start: 2019-01-14 | End: 2019-01-16 | Stop reason: HOSPADM

## 2019-01-14 RX ORDER — LANOLIN 100 %
OINTMENT (GRAM) TOPICAL
Status: DISCONTINUED | OUTPATIENT
Start: 2019-01-14 | End: 2019-01-16 | Stop reason: HOSPADM

## 2019-01-14 RX ORDER — OXYTOCIN 10 [USP'U]/ML
10 INJECTION, SOLUTION INTRAMUSCULAR; INTRAVENOUS
Status: DISCONTINUED | OUTPATIENT
Start: 2019-01-14 | End: 2019-01-15

## 2019-01-14 RX ADMIN — ACETAMINOPHEN 650 MG: 325 TABLET, FILM COATED ORAL at 21:40

## 2019-01-14 RX ADMIN — ROPIVACAINE HYDROCHLORIDE 10 ML: 2 INJECTION, SOLUTION EPIDURAL; INFILTRATION at 17:58

## 2019-01-14 RX ADMIN — FENTANYL CITRATE 100 MCG: 50 INJECTION, SOLUTION INTRAMUSCULAR; INTRAVENOUS at 17:58

## 2019-01-14 RX ADMIN — AMOXICILLIN 250 MG: 250 CAPSULE ORAL at 14:06

## 2019-01-14 RX ADMIN — AMOXICILLIN 250 MG: 250 CAPSULE ORAL at 07:17

## 2019-01-14 RX ADMIN — Medication 12 ML/HR: at 17:55

## 2019-01-14 RX ADMIN — AMOXICILLIN 250 MG: 250 CAPSULE ORAL at 21:40

## 2019-01-14 RX ADMIN — SODIUM CHLORIDE, POTASSIUM CHLORIDE, SODIUM LACTATE AND CALCIUM CHLORIDE 1000 ML: 600; 310; 30; 20 INJECTION, SOLUTION INTRAVENOUS at 17:55

## 2019-01-14 RX ADMIN — OXYTOCIN-SODIUM CHLORIDE 0.9% IV SOLN 30 UNIT/500ML 2 MILLI-UNITS/MIN: 30-0.9/5 SOLUTION at 06:15

## 2019-01-14 RX ADMIN — OXYTOCIN-SODIUM CHLORIDE 0.9% IV SOLN 30 UNIT/500ML 340 ML/HR: 30-0.9/5 SOLUTION at 20:06

## 2019-01-14 NOTE — ANESTHESIA PREPROCEDURE EVALUATION
"Anesthesia Pre-Procedure Evaluation    Patient: Bonnie Welch   MRN: [id number] : 1980          Preoperative Diagnosis: * No surgery found *        Past Medical History:   Diagnosis Date     Kidney stones      &      No past surgical history on file.                     Lab Results   Component Value Date    WBC 11.9 (H) 2019    HGB 11.4 (L) 2019    HCT 33.5 (L) 2019     2019       Preop Vitals  BP Readings from Last 3 Encounters:   19 128/77    Pulse Readings from Last 3 Encounters:   19 101      Resp Readings from Last 3 Encounters:   19 16    SpO2 Readings from Last 3 Encounters:   No data found for SpO2      Temp Readings from Last 1 Encounters:   19 37.7  C (99.9  F) (Temporal)    Ht Readings from Last 1 Encounters:   19 1.626 m (5' 4\")      Wt Readings from Last 1 Encounters:   19 60.3 kg (133 lb)    Estimated body mass index is 22.83 kg/m  as calculated from the following:    Height as of this encounter: 1.626 m (5' 4\").    Weight as of this encounter: 60.3 kg (133 lb).       Anesthesia Plan      History & Physical Review      ASA Status:  2 .             Postoperative Care      Consents                 Leonela Cartagena MD, MD  "

## 2019-01-14 NOTE — PLAN OF CARE
1915- Report received from Roxanne WASSERMAN. Cares assumed.    Vitals stable throughout the night, afebrile. Pt was able to rest over night.   Pitocin Augmentation started per protocol. SVE completed. Pt describes mild cramping starting and leaking clear fluid. Cat 1. Tracing.     PO antibiotics received.     Report given to Coretta WASSERMAN.

## 2019-01-14 NOTE — PROGRESS NOTES
S: Contractions more intense. Tolerating well on birthing ball    O:   Vitals:    19 1200 19 1230 19 1330 19 1430   BP: 119/75  133/76 134/75   Pulse:       Resp: 16 16 16 16   Temp:  99.6  F (37.6  C) 99  F (37.2  C) 99.5  F (37.5  C)   TempSrc:  Temporal Temporal Temporal   Weight:       Height:         Gen: NAD  Abd: Soft, NT  FHT: 135/mod/+accels/no decels  [place]: q2-3 with some coupling  SVE: 2.5/80/-1, vertex, scant blood noted on pad    A/P: 37 y/o  at 34w0d IOL for PPROM. Doing well     Fetal well being - Cat 1, reactive tracing  S/p BMTZ on  and   S/P NICU consult     PPROM  IOL - on pitocin, starting to make some progress in latent labor  GBS negative     Rh+/rubella immune     Pain - per patient preference. Wants to try nitrous oxide, but possibly epidural     Anticipate     Katie Oropeza MD 19 2:55 PM

## 2019-01-14 NOTE — PROGRESS NOTES
S: Pitocin was started at 6 AM, however IV recently infiltrated (being restarted now.)  Has been feeling mild cramps. Continues to leak fluid. A little bit of spotting. No fevers/chills    O:   Vitals:    19 2200 19 0605 19 0726 19 0822   BP: 110/59 121/71 131/62 121/65   Pulse:       Resp: 16 16     Temp: 98.7  F (37.1  C) 97.9  F (36.6  C) 99.2  F (37.3  C) 99.4  F (37.4  C)   TempSrc: Temporal Temporal Temporal Temporal   Weight:       Height:         Gen: NAD  Abd: Soft, no fundal tenderness. Vertex by Leopold's  FHT: 160/mod/+accels/no decels  [place]: q5  SVE: FT/60/-2 this AM    A/P: 37 y/o  at 34w0d IOL for PPROM. Doing well    Fetal well being - Cat 1, reactive tracing  S/p BMTZ on  and   S/P NICU consult  Cephalic by US on , remains so on SVE and [place] today    PPROM  IOL - on pitocin (will be restarted once IV is in)  Continue to monitor for signs infection  Will continue PPROM antibiotics for now, as remote from delivery. GBS negative    Rh+/rubella immune    Pain - per patient preference. Wants to try nitrous oxide, but possibly epidural    Anticipate     Katie Oropeza MD 19 8:58 AM

## 2019-01-15 PROCEDURE — 12000035 ZZH R&B POSTPARTUM

## 2019-01-15 PROCEDURE — 25000132 ZZH RX MED GY IP 250 OP 250 PS 637: Performed by: OBSTETRICS & GYNECOLOGY

## 2019-01-15 RX ADMIN — ACETAMINOPHEN 650 MG: 325 TABLET, FILM COATED ORAL at 16:10

## 2019-01-15 RX ADMIN — OXYCODONE HYDROCHLORIDE 5 MG: 5 TABLET ORAL at 04:16

## 2019-01-15 RX ADMIN — DOCUSATE SODIUM 100 MG: 100 CAPSULE, LIQUID FILLED ORAL at 07:39

## 2019-01-15 RX ADMIN — ACETAMINOPHEN 650 MG: 325 TABLET, FILM COATED ORAL at 20:13

## 2019-01-15 RX ADMIN — AMOXICILLIN 250 MG: 250 CAPSULE ORAL at 22:36

## 2019-01-15 RX ADMIN — OXYCODONE HYDROCHLORIDE 5 MG: 5 TABLET ORAL at 20:13

## 2019-01-15 RX ADMIN — AMOXICILLIN 250 MG: 250 CAPSULE ORAL at 14:09

## 2019-01-15 RX ADMIN — DOCUSATE SODIUM 100 MG: 100 CAPSULE, LIQUID FILLED ORAL at 20:13

## 2019-01-15 RX ADMIN — ACETAMINOPHEN 650 MG: 325 TABLET, FILM COATED ORAL at 07:39

## 2019-01-15 RX ADMIN — OXYCODONE HYDROCHLORIDE 5 MG: 5 TABLET ORAL at 16:10

## 2019-01-15 RX ADMIN — AMOXICILLIN 250 MG: 250 CAPSULE ORAL at 07:40

## 2019-01-15 RX ADMIN — OXYCODONE HYDROCHLORIDE 5 MG: 5 TABLET ORAL at 12:33

## 2019-01-15 RX ADMIN — ACETAMINOPHEN 650 MG: 325 TABLET, FILM COATED ORAL at 04:16

## 2019-01-15 RX ADMIN — OXYCODONE HYDROCHLORIDE 5 MG: 5 TABLET ORAL at 07:40

## 2019-01-15 RX ADMIN — ACETAMINOPHEN 650 MG: 325 TABLET, FILM COATED ORAL at 12:32

## 2019-01-15 NOTE — LACTATION NOTE
Attempted to see Bonnie, she was in NICU.  No further questions at this time. Will follow as needed. Lee Ann OATESN, RN, PHN, RNC-MNN, IBCLC

## 2019-01-15 NOTE — L&D DELIVERY NOTE
Delivery  2019  8:01 PM  by  Vaginal, Spontaneous   Sex:  female Gestational Age: 34 weeks  Delivery Clinician:  Laura Irizarry MD  Living?: 1          APGARS  One minute Five minutes Ten minutes   Skin color:               Heart rate:               Grimace:               Muscle tone:               Breathing:               Totals: 6   8           Presentation/position:               Resuscitation and Interventions: Method:      Oxygen Type:      Intubation Time:    # of Attempts:      ETT Size:         Tracheal Suction:      Tracheal returns:       Rocky Care at Delivery:            Cord information:      Disposition of cord blood:       Blood gases sent?     Complications:      Placenta: Delivered:               appearance.  Comments:   .  Disposition: Pathology    Measurements:  Weight:    Height:    Head circumference:    Chest circumference:     Temperature:      Other providers:         Additional  information:  Forceps:     Verbal Informed Consent Obtained:        Alternative Labor Strategies Discussed:      Emergency Resources Available:        Type:        Accrued Pulling Time:        # of Pulls:       Position:      Fetal Station:        Indications:       Other Indications:      Operative Vaginal Delivery Brief Note Forceps:         Vacuum:     Verbal Informed Consent Obtained:      Alternative Labor Strategies Discussed:      Emergency Resources Available:      Type:       Accrued Pulling Time:        # of Pop-Offs:        # of Pulls:        Position:      Fetal Station:       Indications for Vacuum:        Other Indications:     Operative Vaginal Delivery Brief Note Vacuum:         Shoulder Dystocia Shoulder Dystocia    No data filed           Breech:        : Type:      Indications for Primary:      Indications for Secondary:      Other Indications:         Observed anomalies      Output in Delivery Room:            Patient is 39 yo  @ 34 weeks admitted @ 33 4/7 weeks  with PPROM. Patient was given BMZ for fetal lung maturity, antibiotics for PPROM. She was observed for signs, sx of chorio, PTL. Fetal testing was reassuring. U/S demonstrated vtx presentation, AGA. When patient was 34 weeks, IV pitocin was started to induce labor. Patient progressed through labor without complication. She did receive an epidural for pain managemet when she was approximately 6 cm dilated. She then progressed to Complete dilation. Patient pushed very well and underwent  viable male infant. NNP was present for delivery secondary to gestational age. Infant was stable after delivery and taken to NICU per protocol. Placenta delivered spontaneously, intact, 3VC. Evaluation of vagina and perineum demonstrated midline 2nd degree laceration and left periurethral laceration. 2nd degree laceration was repaired in standard fashion with 3.0 vicryl. Periurethral laceration was repaired with single interrupted 4.0 vicryl suture. Fundus firm after delivery. Baby to NICU in good condition, breathing room air. Mother stable after delivery. Placenta to pathology. Cord gases obtained.   Laura Irizarry MD, MD on 2019 at 8:49 PM

## 2019-01-15 NOTE — PROGRESS NOTES
"OB  Patient comfortable with epidural-starting to note more pressure with ctxns  /58   Pulse 101   Temp 98.4  F (36.9  C) (Temporal)   Resp 16   Ht 1.626 m (5' 4\")   Wt 60.3 kg (133 lb)   LMP 2018   SpO2 99%   Breastfeeding? No   BMI 22.83 kg/m       Cx: C/100/+2  FHT baseline 145 with accels, moderate variability, category 1 with occasional early decels  Ctxns: slightly irregular, q4-5\", pitocin @ 16 mu    A/P: 39 yo  @ 34 weeks, PPROM, IOL started this am  Patient now complete, ready to begin pushing.  FHT reassuring.  Anticipate .  NNP to be present at delivery-plan for NICU admit prn.  Laura Irizarry MD, MD on 2019 at 7:53 PM  "

## 2019-01-15 NOTE — PLAN OF CARE
PT assisted to BR. PT was able to void a large amount. Pericare performed and PT assisted to WC and brought to NICU to see baby. PT verbalizes understanding that she is to tell NICU nurse if she she doesn't feel good .

## 2019-01-15 NOTE — PLAN OF CARE
Patient and spouse transferred to room 414 accompanied by RN. Report received from Amy COLLIER RN previously when patient and spouse were in NICU visiting infant. Patient and spouse oriented to room, call light, and plan of care for the night. Reviewed safety protocols. Reviewed pumping and how to use pump as well as how frequently to pump. Will continue to monitor.

## 2019-01-15 NOTE — PLAN OF CARE
Feels well. Vitals stable. Pumping every 3 hours. Oral pain medications working well.Down to NICU to see baby for extended periods. Will continue to monitor.

## 2019-01-15 NOTE — PROGRESS NOTES
PPD 1    Doing well  Resting    Afebrile  Normotensive    Fundus nt  Ext nt    Rh positive  Rubella immune    A/p    Stable PPD 1    34 week  yesterday  (delivery time )    Routine care    Baby well in NICU per pt     Rusty WATTS

## 2019-01-15 NOTE — ANESTHESIA PROCEDURE NOTES
Peripheral nerve/Neuraxial procedure note : epidural catheter  Pre-Procedure  Performed by Leonela Cartagena MD  Location: OB      Pre-Anesthestic Checklist: patient identified, IV checked, risks and benefits discussed, informed consent, monitors and equipment checked, pre-op evaluation and at physician/surgeon's request    Timeout  Correct Patient: Yes   Correct Procedure: Yes   Correct Site: Yes   Correct Laterality: N/A   Correct Position: Yes   Site Marked: N/A   .   Procedure Documentation    .    Procedure:    Epidural catheter.  Insertion Site:L3-4  (midline approach) Injection technique: LORT saline   Local skin infiltrated with 3 mL of 1% lidocaine.  ROB at 3.5 cm     Patient Prep;mask, sterile gloves, povidone-iodine 7.5% surgical scrub, patient draped.  .  Needle: ToScaleMPy needle Needle Gauge: 17.    Needle Length (Inches) 3.5  # of attempts: 1 and # of redirects: : 0. .   Catheter: 19 G . .  Catheter threaded easily  4 cm epidural space.  8 cm at skin.   .    Assessment/Narrative  Paresthesias: No.  .  .  Aspiration negative for heme or CSF  . Test dose of 3 mL lidocaine 1.5% w/ 1:200,000 epinephrine at. Test dose negative for signs of intravascular, subdural or intrathecal injection. Comments:  Pt tolerated well.   Immediately to supine with ALIE.   FHTs stable post-procedure.   No complications.

## 2019-01-15 NOTE — PLAN OF CARE
Vitals within defined limits. BPs slightly elevated in the 130s/70s. Denies headache, epigastric pain, or vision changes. Fundus firm. Lochia scant, no clots noted. Voiding without issues. Patient reports having soreness to perineum, using Tylenol and PRN Oxy with relief. Encouraged ice packs and tucks for comfort as well. Reviewed pumping and how frequently to pump as well as how to clean/sterilize parts. Will continue to monitor.

## 2019-01-16 VITALS
BODY MASS INDEX: 22.71 KG/M2 | OXYGEN SATURATION: 98 % | HEIGHT: 64 IN | SYSTOLIC BLOOD PRESSURE: 144 MMHG | RESPIRATION RATE: 16 BRPM | TEMPERATURE: 97.7 F | HEART RATE: 82 BPM | WEIGHT: 133 LBS | DIASTOLIC BLOOD PRESSURE: 80 MMHG

## 2019-01-16 LAB — COPATH REPORT: NORMAL

## 2019-01-16 PROCEDURE — 25000132 ZZH RX MED GY IP 250 OP 250 PS 637: Performed by: OBSTETRICS & GYNECOLOGY

## 2019-01-16 RX ADMIN — AMOXICILLIN 250 MG: 250 CAPSULE ORAL at 06:49

## 2019-01-16 RX ADMIN — ACETAMINOPHEN 650 MG: 325 TABLET, FILM COATED ORAL at 10:25

## 2019-01-16 RX ADMIN — ACETAMINOPHEN 650 MG: 325 TABLET, FILM COATED ORAL at 00:15

## 2019-01-16 RX ADMIN — OXYCODONE HYDROCHLORIDE 5 MG: 5 TABLET ORAL at 00:15

## 2019-01-16 RX ADMIN — STANDARDIZED SENNA CONCENTRATE AND DOCUSATE SODIUM 1 TABLET: 8.6; 5 TABLET, FILM COATED ORAL at 07:38

## 2019-01-16 RX ADMIN — ACETAMINOPHEN 650 MG: 325 TABLET, FILM COATED ORAL at 06:19

## 2019-01-16 RX ADMIN — OXYCODONE HYDROCHLORIDE 5 MG: 5 TABLET ORAL at 06:19

## 2019-01-16 RX ADMIN — STANDARDIZED SENNA CONCENTRATE AND DOCUSATE SODIUM 1 TABLET: 8.6; 5 TABLET, FILM COATED ORAL at 10:27

## 2019-01-16 NOTE — LACTATION NOTE
This note was copied from a baby's chart.  Routine visit with Rashaun Nicole and baby boy.  Grandmother also present.    Breastfeeding general information reviewed.   Advised to pump  8-12x/day and hand express.  Instructed on hand expression. [employer] links given.    Explained benefits of holding and skin to skin.  Encouraged lots of skin to skin.  Yielding drops with pumping.  Already rented a hospital grade pump and has a retail pump on the way from insurance company.  No further questions at this time.   Will follow as needed.   Lee Ann Garza BSN, RN, PHN, RNC-MNN, IBCLC

## 2019-01-16 NOTE — PLAN OF CARE
VSS.  Pain well controlled with tylenol and oxycodone.  Up independently in room.  Pumping overnight.   providing support at beside. Questions encouraged. On pathway. Continue to monitor and notify MD as needed.

## 2019-01-16 NOTE — PROGRESS NOTES
PPD #2    No complaints  Pain better only needs tylenol.  Baby doing well, in NICU, saw pt there.    144/80    97.76    Doing well    Home   vits  Rent br pump for now  Discontinue antibiotics

## 2019-01-16 NOTE — PLAN OF CARE
Vitals stable. Feels well. Oral opain medications controlling pain well. Pumping every 3 hours for baby in NICU. Ready for discharge home.

## 2019-01-16 NOTE — ANESTHESIA POSTPROCEDURE EVALUATION
Patient: Bonnie Welch    * No procedures listed *    Diagnosis:* No pre-op diagnosis entered *  Diagnosis Additional Information: No value filed.    Anesthesia Type:  No value filed.    Note:  Anesthesia Post Evaluation    Patient location during evaluation: bedside  Patient participation: Able to fully participate in evaluation  Level of consciousness: awake and awake and alert  Pain management: adequate  Airway patency: patent  Cardiovascular status: acceptable  Respiratory status: acceptable  Hydration status: acceptable  PONV: none     Anesthetic complications: None    Comments: Ambulating.  Denies HA, paresthesias or complications related to epidural.          Last vitals:  Vitals:    01/15/19 0736 01/15/19 1600 01/16/19 0015   BP: 127/81 133/87 132/82   Pulse: 74     Resp:  16 16   Temp: 36.7  C (98.1  F) 36.7  C (98  F) 36.6  C (97.9  F)   SpO2:            Electronically Signed By: Zeus Nino MD  January 16, 2019  3:29 AM

## 2019-01-16 NOTE — PLAN OF CARE
VSS.  Pain well controlled, requesting prn pain medications as needed.  Up independently in room. Visiting  in NICU, doing skin-to-skin care. On pathway. Continue to monitor and notify MD as needed.

## 2019-01-17 NOTE — DISCHARGE SUMMARY
Admit Date:     2019   Discharge Date:     2019      HISTORY OF PRESENT ILLNESS:  A 38-year-old primigravida female.  Her estimated due date is 2019.  Her pregnancy was complicated by  premature rupture of membranes early at 33-4/7 weeks' gestation on 2019.  She was treated with betamethasone x2 doses and given latency antibiotics.      She continued to do well, remained undelivered until 34 weeks' gestation, at which time she was begun on Pitocin and induction of labor.  She went on to deliver a 4 pound 9 ounce male infant and has done well.  Infant has been in the  Intensive Care Unit due to size and gestational age, but has been doing fine.      The patient has remained afebrile following delivery.  She was continued on antibiotics until this time.  She does not tolerate NSAIDs and has been taking some oxycodone, mostly Tylenol for discomfort.  She is planning to breast feed.      DISCHARGE INSTRUCTIONS:  The patient will be discharged later today in satisfactory condition.  She was given routine postpartum instructions, and will be seen in the office in 6 weeks.  She is to continue taking her prenatal vitamins.  She will rent a breast pump until her formal breast pump comes through her insurance.  She will take Tylenol for discomfort.         AQUILINO SAHNI MD             D: 2019   T: 2019   MT: HAYLEY      Name:     LELE DENNIS   MRN:      [id number]        Account:        [id number]   :      1980           Admit Date:     2019                                  Discharge Date: 2019      Document: [id number]

## 2019-01-25 ENCOUNTER — DOCUMENTATION ONLY (OUTPATIENT)
Dept: CARE COORDINATION | Facility: CLINIC | Age: 39
End: 2019-01-25

## 2019-01-25 NOTE — PROGRESS NOTES
[facility] will be sharing updates with you on Maternal Child Health Referral requests for home care services.  This is for care coordination purposes and alert you to referral status.  We received the referral for  Bonnie Welch; MRN [id number] and want to update you:    [facility] has made 2 attempts to contact patient by phone and text message over the last 4 days.   We have not had any response from patient.  Final message was left advising patient to follow up with Primary Care Providers for mom and baby.    Sincerely [facility]  Jovany Lee  [phone]

## 2019-01-30 ENCOUNTER — LACTATION ENCOUNTER (OUTPATIENT)
Age: 39
End: 2019-01-30

## 2019-01-30 NOTE — LACTATION NOTE
This note was copied from a baby's chart.  Routine visit with Trevon and parents.  Changed to 20mm shield and baby latching on well.  Mother states feels comfortable.  Pumping after feeds and yielding good amounts.  Baby able to latch on well to the left breast in cradle hold.  Instructed on other holds.  IFD started this after noon and baby transferred 17ml last feeding.   No further questions at this time. Lee Ann Randall-Marjan BSN, RN, PHN, RNC-MNN, IBCLC

## 2020-02-26 LAB
ABO + RH BLD: NORMAL
ABO + RH BLD: NORMAL
BLD GP AB SCN SERPL QL: NORMAL
HBV SURFACE AG SERPL QL IA: NORMAL
HIV 1+2 AB+HIV1 P24 AG SERPL QL IA: NORMAL
RUBELLA ANTIBODY IGG QUANTITATIVE: NORMAL IU/ML
TREPONEMA ANTIBODIES: NON REACTIVE

## 2020-03-11 ENCOUNTER — HEALTH MAINTENANCE LETTER (OUTPATIENT)
Age: 40
End: 2020-03-11

## 2020-04-23 ENCOUNTER — MEDICAL CORRESPONDENCE (OUTPATIENT)
Dept: HEALTH INFORMATION MANAGEMENT | Facility: CLINIC | Age: 40
End: 2020-04-23

## 2020-04-28 ENCOUNTER — TRANSFERRED RECORDS (OUTPATIENT)
Dept: HEALTH INFORMATION MANAGEMENT | Facility: CLINIC | Age: 40
End: 2020-04-28

## 2020-04-28 ENCOUNTER — TRANSCRIBE ORDERS (OUTPATIENT)
Dept: MATERNAL FETAL MEDICINE | Facility: CLINIC | Age: 40
End: 2020-04-28

## 2020-04-28 DIAGNOSIS — O26.90 PREGNANCY RELATED CONDITION: Primary | ICD-10-CM

## 2020-05-11 ENCOUNTER — PRE VISIT (OUTPATIENT)
Dept: MATERNAL FETAL MEDICINE | Facility: CLINIC | Age: 40
End: 2020-05-11

## 2020-05-14 ENCOUNTER — VIRTUAL VISIT (OUTPATIENT)
Dept: MATERNAL FETAL MEDICINE | Facility: CLINIC | Age: 40
End: 2020-05-14
Attending: OBSTETRICS & GYNECOLOGY
Payer: COMMERCIAL

## 2020-05-14 DIAGNOSIS — O09.522 SUPERVISION OF ELDERLY MULTIGRAVIDA IN SECOND TRIMESTER: Primary | ICD-10-CM

## 2020-05-14 DIAGNOSIS — O26.90 PREGNANCY RELATED CONDITION: ICD-10-CM

## 2020-05-14 PROCEDURE — 40000072 ZZH STATISTIC GENETIC COUNSELING, < 16 MIN: Mod: TEL,ZF | Performed by: GENETIC COUNSELOR, MS

## 2020-05-14 NOTE — PROGRESS NOTES
"[facility]  Genetic Counseling Consult    Patient:  Bonnie Welch YOB: 1980   Date of Service:  20      Bonnie Welch was seen at the [facility] for genetic consultation as part of her appointment for comprehensive ultrasound.  The indication for genetic counseling is advanced maternal age. She was accompanied on the phone call with her spouse, Rashaun.     Bonnie Welch was evaluated via a billable telephone visit at [facility] for genetic consultation given the indication.      The patient has been notified of following:    This telephone visit will be conducted via a call between you and your physician/provider. We have found that certain health care needs can be provided without the need for a physical exam. This service lets us provide the care you need with a short phone conversation. If a prescription is necessary we can send it directly to your pharmacy. If lab work is needed we can place an order for that and you can then stop by our lab to have the test done at a later time.     If during the course of the call the provider feels a telephone visit is not appropriate, you will not be charged for this service.    General consent of services was obtained for this visit verbally. Bonnie answered \"No\" to research.       Impression/Plan:   1. Bonnie had a cell-free fetal DNA test earlier in pregnancy, which was normal.    2. Bonnie will have a comprehensive (level II) ultrasound on 2020.  Please see the ultrasound report for further details.    3. The patient declines genetic amniocentesis and maternal serum screening today.    Pregnancy History:   /Parity:     Age at Delivery: 40 year old  OLI: 10/6/2020, by Last Menstrual Period  Gestational Age: 19w2d    No significant complications or exposures were reported in the current " pregnancy.    Bonnie s pregnancy history is significant for one 8 week miscarriage and one 34w prolonged PPROM, healthy son. She is taking progesterone shots during this pregnancy since week 16     Medical History:   Bonnie s reported medical history is not expected to impact pregnancy management or risks to fetal development.       Family History:   A three-generation pedigree was obtained during a previous genetic counseling visit. Please see that consultation note from 2018. No significant updates were reported today per Bonnie.        Carrier Screening:   The patient reports that she and the father of the pregnancy have  ancestry:      Cystic fibrosis is an autosomal recessive genetic condition that occurs with increased frequency in individuals of  ancestry and carrier screening for this condition is available.  In addition,  screening in the [facility] includes cystic fibrosis.      Expanded carrier screening for mutations in a large panel of genes associated with autosomal recessive conditions including cystic fibrosis, spinal muscular atrophy, and others, is now available.      The patient has had previous carrier screening for cystic fibrosis, spinal muscular atrophy, fragile X syndrome, hemoglobinopathies, and various other metabolic disorders, the results of which were nengative.  A copy of the report was available for our review today.       Risk Assessment for Chromosome Conditions:   We explained that the risk for fetal chromosome abnormalities increases with maternal age. We discussed specific features of common chromosome abnormalities, including Down syndrome, trisomy 13, trisomy 18, and sex chromosome trisomies.      At age 40 at midtrimester, the risk to have a baby with Down syndrome is 1 in 98.     At age 40 at midtrimester, the risk to have a baby with any chromosome abnormality is 1 in 51.       Bonnie had maternal serum screening earlier in  pregnancy.    Non-invasive Prenatal Testing (NIPT)    Maternal plasma cell-free DNA testing    Screens for fetal trisomy 21, trisomy 13, trisomy 18, and sex chromosome aneuploidy    First trimester ultrasound with nuchal translucency and nasal bone assessment was not performed in this pregnancy, to our knowledge.    Bonnie had a Innatal test earlier in pregnancy; we reviewed the results today, which are normal for chromosome 13, chromosome 18 and chromosome 21 (no aneuploidy detected)    Given the accuracy of this test, these results greatly decrease the chance for certain fetal chromosome abnormalities    We discussed the limitations of normal NIPT results    MSAFP (after 15 weeks for open neural tube defect screening) results were within normal limits, which decreased the risk of an open neural tube defect in the pregnancy to 1 in 10,000.    Testing Options:   We discussed the following options:   Genetic Amniocentesis    Invasive procedure typically performed in the second trimester by which amniotic fluid is obtained for the purpose of chromosome analysis and/or other prenatal genetic analysis    Diagnostic results; >99% sensitivity for fetal chromosome abnormalities    AFAFP measurement tests for open neural tube defects       Comprehensive (Level II) ultrasound: Detailed ultrasound performed between 18-22 weeks gestation to screen for major birth defects and markers for aneuploidy.        We reviewed the benefits and limitations of this testing.  Screening tests provide a risk assessment specific to the pregnancy for certain fetal chromosome abnormalities, but cannot definitively diagnose or exclude a fetal chromosome abnormality.  Follow-up genetic counseling and consideration of diagnostic testing is recommended with any abnormal screening result.     Diagnostic tests carry inherent risks- including risk of miscarriage- that require careful consideration.  These tests can detect fetal chromosome  abnormalities with greater than 99% certainty.  Results can be compromised by maternal cell contamination or mosaicism, and are limited by the resolution of cytogenetic G-banding technology.  There is no screening nor diagnostic test that can detect all forms of birth defects or mental disability.    It was a pleasure to be involved with Bonnie s care. Face-to-face time of the meeting was 11 minutes.    Phone call contact time  Call Started at 1027  Call Ended at 1038    Mary Nichols MS, [facility]  Licensed Genetic Counselor   [facility]  Maternal Fetal Medicine  [email or web address]  Office: [phone]  Pager [phone]  MFM: [phone]   Fax: [phone]

## 2020-05-18 ENCOUNTER — OFFICE VISIT (OUTPATIENT)
Dept: MATERNAL FETAL MEDICINE | Facility: CLINIC | Age: 40
End: 2020-05-18
Attending: OBSTETRICS & GYNECOLOGY
Payer: COMMERCIAL

## 2020-05-18 ENCOUNTER — HOSPITAL ENCOUNTER (OUTPATIENT)
Dept: ULTRASOUND IMAGING | Facility: CLINIC | Age: 40
End: 2020-05-18
Attending: OBSTETRICS & GYNECOLOGY
Payer: COMMERCIAL

## 2020-05-18 DIAGNOSIS — O09.522 MULTIGRAVIDA OF ADVANCED MATERNAL AGE IN SECOND TRIMESTER: Primary | ICD-10-CM

## 2020-05-18 DIAGNOSIS — O09.892 H/O PRETERM DELIVERY, CURRENTLY PREGNANT, SECOND TRIMESTER: ICD-10-CM

## 2020-05-18 DIAGNOSIS — O26.90 PREGNANCY RELATED CONDITION: ICD-10-CM

## 2020-05-18 PROCEDURE — 76811 OB US DETAILED SNGL FETUS: CPT

## 2020-05-18 PROCEDURE — 76817 TRANSVAGINAL US OBSTETRIC: CPT | Performed by: OBSTETRICS & GYNECOLOGY

## 2020-05-18 NOTE — PROGRESS NOTES
Please see full imaging report from [employer] program under imaging tab.    Emre Cotter MD  Maternal Fetal Medicine

## 2020-08-20 ENCOUNTER — HOSPITAL ENCOUNTER (OUTPATIENT)
Facility: CLINIC | Age: 40
Discharge: HOME | End: 2020-08-20
Attending: OBSTETRICS & GYNECOLOGY | Admitting: OBSTETRICS & GYNECOLOGY
Payer: COMMERCIAL

## 2020-08-20 PROCEDURE — 25000128 H RX IP 250 OP 636

## 2020-08-20 PROCEDURE — 96372 THER/PROPH/DIAG INJ SC/IM: CPT

## 2020-08-20 PROCEDURE — G0463 HOSPITAL OUTPT CLINIC VISIT: HCPCS

## 2020-08-20 RX ORDER — MULTIVIT-MIN/IRON/FOLIC ACID/K 18-600-40
1000 CAPSULE ORAL
COMMUNITY

## 2020-08-20 RX ORDER — BETAMETHASONE SODIUM PHOSPHATE AND BETAMETHASONE ACETATE 3; 3 MG/ML; MG/ML
12 INJECTION, SUSPENSION INTRA-ARTICULAR; INTRALESIONAL; INTRAMUSCULAR; SOFT TISSUE EVERY 24 HOURS
Status: DISCONTINUED | OUTPATIENT
Start: 2020-08-20 | End: 2020-08-20 | Stop reason: HOSPADM

## 2020-08-20 RX ORDER — BETAMETHASONE SODIUM PHOSPHATE AND BETAMETHASONE ACETATE 3; 3 MG/ML; MG/ML
INJECTION, SUSPENSION INTRA-ARTICULAR; INTRALESIONAL; INTRAMUSCULAR; SOFT TISSUE
Status: COMPLETED
Start: 2020-08-20 | End: 2020-08-20

## 2020-08-20 RX ORDER — FAMOTIDINE 20 MG
TABLET ORAL
COMMUNITY

## 2020-08-20 RX ADMIN — BETAMETHASONE SODIUM PHOSPHATE AND BETAMETHASONE ACETATE 12 MG: 3; 3 INJECTION, SUSPENSION INTRA-ARTICULAR; INTRALESIONAL; INTRAMUSCULAR; SOFT TISSUE at 17:34

## 2020-08-20 NOTE — DISCHARGE INSTRUCTIONS
Discharge Instruction for Undelivered Patients      You were seen for: Admisnistration of the first of two betamethasone injections   We Consulted: Dr. Irizarry  You had (Test or Medicine):Betamethasone     Diet:   Drink 8 to 12 glasses of liquids (milk, juice, water) every day.  You may eat meals and snacks.     Activity:  Count fetal kicks everyday (see handout)  Call your doctor or nurse midwife if your baby is moving less than usual.     Call your provider if you notice:  Swelling in your face or increased swelling in your hands or legs.  Headaches that are not relieved by Tylenol (acetaminophen).  Changes in your vision (blurring: seeing spots or stars.)  Nausea (sick to your stomach) and vomiting (throwing up).   Weight gain of 5 pounds or more per week.  Heartburn that doesn't go away.  Signs of bladder infection: pain when you urinate (use the toilet), need to go more often and more urgently.  The bag of gandhi (rupture of membranes) breaks, or you notice leaking in your underwear.  Bright red blood in your underwear.  Abdominal (lower belly) or stomach pain.  Second (plus) baby: Contractions (tightening) less than 10 minutes apart and getting stronger.  *If less than 34 weeks: Contractions (tightenings) more than 6 times in one hour.  Increase or change in vaginal discharge (note the color and amount)    Follow-up:  return to [facility] on 8/21/20 at 6pm for the second injection.

## 2020-08-20 NOTE — PLAN OF CARE
Pt is a  33w2d of Dr. Irizarry who was sent to [facility] from the clinic for the first of two Betamethasone injections. Pt has a history of PPROM and  delivery at 34 weeks. She was dilated to 1cm today and Dr. Irizarry would like her to receive betamethasone.     Pt denies cramping, back pain, pelvic pressure, VB, LOF, headache, visual changes, epigastric pain or dysuria.     BMZ given. Pt d/c'd to home ambulatory with instructions to return to [facility] tomorrow 20 at 6pm for the second dose.

## 2020-08-21 ENCOUNTER — HOSPITAL ENCOUNTER (OUTPATIENT)
Facility: CLINIC | Age: 40
End: 2020-08-21
Admitting: OBSTETRICS & GYNECOLOGY
Payer: COMMERCIAL

## 2020-08-21 ENCOUNTER — HOSPITAL ENCOUNTER (OUTPATIENT)
Facility: CLINIC | Age: 40
Discharge: HOME | End: 2020-08-21
Attending: OBSTETRICS & GYNECOLOGY | Admitting: OBSTETRICS & GYNECOLOGY
Payer: COMMERCIAL

## 2020-08-21 PROCEDURE — 25000128 H RX IP 250 OP 636: Performed by: OBSTETRICS & GYNECOLOGY

## 2020-08-21 PROCEDURE — 96372 THER/PROPH/DIAG INJ SC/IM: CPT

## 2020-08-21 RX ORDER — BETAMETHASONE SODIUM PHOSPHATE AND BETAMETHASONE ACETATE 3; 3 MG/ML; MG/ML
12 INJECTION, SUSPENSION INTRA-ARTICULAR; INTRALESIONAL; INTRAMUSCULAR; SOFT TISSUE ONCE
Status: COMPLETED | OUTPATIENT
Start: 2020-08-21 | End: 2020-08-21

## 2020-08-21 RX ORDER — BETAMETHASONE SODIUM PHOSPHATE AND BETAMETHASONE ACETATE 3; 3 MG/ML; MG/ML
INJECTION, SUSPENSION INTRA-ARTICULAR; INTRALESIONAL; INTRAMUSCULAR; SOFT TISSUE
Status: DISCONTINUED
Start: 2020-08-21 | End: 2020-08-21 | Stop reason: HOSPADM

## 2020-08-21 RX ADMIN — BETAMETHASONE SODIUM PHOSPHATE AND BETAMETHASONE ACETATE 12 MG: 3; 3 INJECTION, SUSPENSION INTRA-ARTICULAR; INTRALESIONAL; INTRAMUSCULAR; SOFT TISSUE at 18:00

## 2020-08-21 NOTE — PLAN OF CARE
"Patient returned to [facility] for 2nd dose of Betamethasone. 12mg betamethasone given IM Left glut. Patient tolerated well. Patient received discharge instructions yesterday. \"I don't need another set of discharge instructions today.\" Patient to home ambulatory.  "

## 2020-09-10 LAB — GROUP B STREP PCR: NORMAL

## 2020-09-18 ENCOUNTER — HOSPITAL ENCOUNTER (OUTPATIENT)
Facility: CLINIC | Age: 40
Discharge: HOME | End: 2020-09-18
Attending: OBSTETRICS & GYNECOLOGY | Admitting: OBSTETRICS & GYNECOLOGY
Payer: COMMERCIAL

## 2020-09-18 VITALS
DIASTOLIC BLOOD PRESSURE: 77 MMHG | WEIGHT: 137 LBS | RESPIRATION RATE: 16 BRPM | HEIGHT: 64 IN | HEART RATE: 89 BPM | SYSTOLIC BLOOD PRESSURE: 135 MMHG | BODY MASS INDEX: 23.39 KG/M2 | TEMPERATURE: 97.9 F

## 2020-09-18 PROCEDURE — 59412 ANTEPARTUM MANIPULATION: CPT

## 2020-09-18 PROCEDURE — 59025 FETAL NON-STRESS TEST: CPT | Mod: 76

## 2020-09-18 PROCEDURE — 25000128 H RX IP 250 OP 636: Performed by: OBSTETRICS & GYNECOLOGY

## 2020-09-18 PROCEDURE — 59025 FETAL NON-STRESS TEST: CPT

## 2020-09-18 RX ORDER — TERBUTALINE SULFATE 1 MG/ML
INJECTION, SOLUTION SUBCUTANEOUS
Status: DISCONTINUED
Start: 2020-09-18 | End: 2020-09-18 | Stop reason: HOSPADM

## 2020-09-18 RX ORDER — TERBUTALINE SULFATE 1 MG/ML
0.25 INJECTION, SOLUTION SUBCUTANEOUS ONCE
Status: COMPLETED | OUTPATIENT
Start: 2020-09-18 | End: 2020-09-18

## 2020-09-18 RX ORDER — LIDOCAINE 40 MG/G
CREAM TOPICAL
Status: DISCONTINUED | OUTPATIENT
Start: 2020-09-18 | End: 2020-09-18 | Stop reason: HOSPADM

## 2020-09-18 RX ADMIN — TERBUTALINE SULFATE 0.25 MG: 1 INJECTION SUBCUTANEOUS at 09:02

## 2020-09-18 ASSESSMENT — MIFFLIN-ST. JEOR: SCORE: 1281.43

## 2020-09-18 NOTE — OP NOTE
Procedure Date: 2020      PREOPERATIVE DIAGNOSES:     1. 37 weeks and 3/7 day intrauterine pregnancy.   2. Татьяна breech presentation      POSTOPERATIVE DIAGNOSIS:  1.  37 3/7 weeks intrauterine pregnancy.  2.  Vertex presentation.      PROCEDURE:  External cephalic version.      SURGEON:  Laura Irizarry MD      ANESTHESIA:  None.      MEDICATIONS GIVEN:  Terbutaline, subcutaneous 1 dose.      COMPLICATIONS:  None.      CLINICAL NOTE:  The patient is a 40-year-old  3, para 1-0-1-1, who presents at 37 and 3/7 weeks for external cephalic version.  Pregnancy complicated by advanced maternal age and also history of  delivery.  This pregnancy, she has received weekly progesterone shots and also received betamethasone for early cervical dilation.  At her 36-week visit, ultrasound was performed for estimated fetal weight and at that time, the baby was found to be in татьяна breech presentation with increased amniotic fluid.  The patient was encouraged to do pelvic tilt exercises to encourage a spontaneous version.  At her 37-week visit, ultrasound was performed, confirming persistent татьяна breech presentation, again with slightly increased amniotic fluid.  The patient was advised of the possible risks and benefits of an external cephalic version.  She wishes to proceed.      DETAILS OF PROCEDURE:  The [facility], where the external cephalic version procedure was reviewed with her.  She was made aware of the possible risks including labor, spontaneous rupture of membranes and possible fetal distress that could all indicate immediate  section today.  The patient is aware of these risks and wishes to proceed.  A consent form was signed.  A nonstress test was then done on the baby, which was noted to be reactive with minimal contractions noted.  She was then given 1 dose of terbutaline 0.25 mg subcutaneous.  Ultrasound confirmed a татьяна breech presentation with  the fetal vertex in the right upper quadrant, with the back on the maternal left.  Using external gentle pressure, the breech was attempted to be lifted out of the maternal pelvis, and the fetal vertex was then pushed down towards the pelvis.  Pressure was applied in this manner for about 1 minute.  At this point in time, the patient was uncomfortable, so the pressure was released.  Ultrasound then was repeated showing that the fetal vertex was still in the upper maternal right upper quadrant with reassuring Fetal heart beat.  The patient was allowed to rest.  A second attempt was made at external cephalic version.  This time, the breech was better elevated out of the maternal pelvis.  Gentle pressure was applied to the fetal vertex, and there was movement of the fetal vertex down into the maternal pelvis.  Pressure was released, and bedside ultrasound was performed, confirming now fetus to be in vertex presentation.  The patient tolerated the procedure well.  An NST will be performed post-procedure, and labor precautions were then again reviewed with the patient.         FINA CRUZ MD             D: 2020   T: 2020   MT:       Name:     LELE DENNIS   MRN:      [id number]        Account:        [id number]   :      1980           Procedure Date: 2020      Document: [id number]

## 2020-09-18 NOTE — PROGRESS NOTES
OB  Patient is 39 yo  @ 37 3/7 weeks presents today for external cephalic version. Baby was found to be breech last week on scheduled ultrasound for EFW. David breech presentation confirmed yesterday in the office. Increased RADHA noted last week. Pregnancy complicated by AMA, history of  delivery with first pregnancy. Patient has received weekly P4 shots this pregnancy, completed @ 36 weeks.   Risks and benefits of external cephalic version are reviewed with the patient. She is aware that attempting ECV can result in possible labor, SROM, fetal distress which could indicate immediate delivery by c/s today. She wishes to proceed. Consent form signed.    A/P: 40 yho  @ 37 3/7 weeks, david breech presentation  Obtain NST prior to attempt.  Give Terbutaline subcutaneous.  U/S at bedside.   Patient has been NPO.     Laura Irizarry MD, MD on 2020 at 8:57 AM

## 2020-09-18 NOTE — DISCHARGE INSTRUCTIONS
If Your Baby Is Breech: External Cephalic Version (ECV)  Toward the end of pregnancy, most babies move into a head-first position for childbirth. But in some cases a baby is in a breech position. This means the baby s buttocks or feet are in place to be delivered first. A breech position makes it difficult to have a vaginal delivery.  If your baby is in a breech position, your healthcare provider may try to turn the baby so that he or she is head-first. This procedure is called an external cephalic version or ECV. An ECV may be done if you are between 36 to 38 weeks (near term) in your pregnancy, unless there are reasons not to do it. If the ECV is successful, a vaginal delivery is more likely.  Before the procedure  This procedure is usually done in a hospital. Follow any directions you re given for not eating or drinking before the procedure.  Before the ECV, the medical team will connect you to a fetal monitor. This is done to check your baby s well-being during the procedure. You may also need the following tests:  Ultrasound  This may be done to:    Confirm that the baby is in a breech position    Find out how much amniotic fluid is in the uterus    Confirm where the placenta is    Find or rule out any birth defects (congenital abnormalities)    See if the umbilical cord is around the baby s neck. This is called a nuchal cord.  Nonstress test and biophysical profile  These tests check your baby's heart, well-being, and contraction pattern. One or both of these tests may be done before and after the ECV.  Blood tests  Blood is taken to find out your blood type, screen, and a complete blood count in case of an emergency.  During the procedure      You will stay connected to the fetal monitor. This is done to check your baby's well-being during the procedure.    An IV (intravenous) line may be placed in your arm to give fluids or medicines if needed.    The team may give you medicine to relax your uterus. This  medicine can make it easier for the healthcare provider to rotate your baby.    You will be placed in a special position on the hospital bed.    The healthcare provider will then put his or her hands at certain points on your lower belly over your uterus.     The provider will try to push the baby into a head-down position. This is done by trying to make the baby do a slow-motion forward roll or back flip. You will feel pressure during this part of the procedure.     Once the procedure is complete, the medical team will raise the head of your bed. This will help keep the baby in the head-down position.  Changing your baby s position    Your healthcare provider presses down on your belly and locates your baby s head and bottom.    By pressing down on your belly, your healthcare provider may be able to rotate the baby into a head-first position. This often will allow a vaginal birth.  After the procedure    You will stay connected to the fetal monitor. This is done to check your baby s well-being. It also checks for contractions, which can happen after an ECV. You will be monitored up to 2 hours after the ECV, or as directed by your provider.    If you are Rh-negative, your healthcare provider may order an Rh immunoglobulin injection. This is done to prevent an immune system response (Rh sensitization) that can cause problems in future pregnancies. It is also done to prevent a condition (fetomaternal hemorrhage) that can cause complications if your baby s blood enters into your bloodstream before or during delivery.    Once you are home, follow any directions your healthcare provider gives you for eating or drinking after the procedure.    Follow all specific discharge instructions from your healthcare provider.  Follow-up appointment  Your healthcare provider may ask you to schedule appointments more often to check your baby s position. Follow the instructions from your healthcare provider.  When to call your  healthcare provider  Call your healthcare provider if any of the following happen:    You have more contractions    Fluid or blood is leaking from your vagina    Your baby is moving less    You have other specific signs or symptoms as directed by your healthcare provider    You have a significant amount of vaginal bleeding   Delivering your baby  Even if your baby s position can t be changed, you may be able to have a vaginal delivery. The type of delivery you have should depend on your healthcare provider s experience. Most providers prefer to do a  section or surgical delivery for a breech baby. That s because the safety and welfare of you and your  are most important. For a  delivery, you will have medicine to block pain (anesthesia). But you are usually awake and alert.  Date Last Reviewed: 10/1/2017    4328-8083 [employer]. [address] [place] [address], [place] [address]. All rights reserved. This information is not intended as a substitute for professional medical care. Always follow your healthcare professional's instructions.

## 2020-09-18 NOTE — PROCEDURES
OB Procedure Note    PreOp Diagnosis: 37 3/7 IUP                                 David Breech Presentation  PostOp Diagnosis: 37 3/7 weeks IUP                                   Vertex Presentation  Procedure: External Cephalic Version  Surgeon: Laura Irizarry MD  Anesthesia: None  Medication: Terbutaline SC  Complications None    See dictation [id number]

## 2020-09-18 NOTE — PLAN OF CARE
Patient is not experiencing any uterine contractions or discomfort after the external version.  The patient is discharged to home with her mother.

## 2020-09-24 DIAGNOSIS — Z3A.39 39 WEEKS GESTATION OF PREGNANCY: Primary | ICD-10-CM

## 2020-09-28 DIAGNOSIS — Z3A.39 39 WEEKS GESTATION OF PREGNANCY: ICD-10-CM

## 2020-09-28 LAB
SARS-COV-2 RNA SPEC QL NAA+PROBE: NOT DETECTED
SPECIMEN SOURCE: NORMAL

## 2020-09-28 PROCEDURE — U0003 INFECTIOUS AGENT DETECTION BY NUCLEIC ACID (DNA OR RNA); SEVERE ACUTE RESPIRATORY SYNDROME CORONAVIRUS 2 (SARS-COV-2) (CORONAVIRUS DISEASE [COVID-19]), AMPLIFIED PROBE TECHNIQUE, MAKING USE OF HIGH THROUGHPUT TECHNOLOGIES AS DESCRIBED BY CMS-2020-01-R: HCPCS | Performed by: OBSTETRICS & GYNECOLOGY

## 2020-10-01 ENCOUNTER — ANESTHESIA (OUTPATIENT)
Dept: OBGYN | Facility: CLINIC | Age: 40
End: 2020-10-01
Payer: COMMERCIAL

## 2020-10-01 ENCOUNTER — ANESTHESIA EVENT (OUTPATIENT)
Dept: OBGYN | Facility: CLINIC | Age: 40
End: 2020-10-01
Payer: COMMERCIAL

## 2020-10-01 ENCOUNTER — HOSPITAL ENCOUNTER (INPATIENT)
Facility: CLINIC | Age: 40
LOS: 2 days | Discharge: HOME | End: 2020-10-03
Attending: OBSTETRICS & GYNECOLOGY | Admitting: OBSTETRICS & GYNECOLOGY
Payer: COMMERCIAL

## 2020-10-01 PROBLEM — Z3A.39 39 WEEKS GESTATION OF PREGNANCY: Status: ACTIVE | Noted: 2020-10-01

## 2020-10-01 LAB
ABO + RH BLD: NORMAL
ABO + RH BLD: NORMAL
SPECIMEN EXP DATE BLD: NORMAL

## 2020-10-01 PROCEDURE — 36415 COLL VENOUS BLD VENIPUNCTURE: CPT | Performed by: OBSTETRICS & GYNECOLOGY

## 2020-10-01 PROCEDURE — 86900 BLOOD TYPING SEROLOGIC ABO: CPT | Performed by: OBSTETRICS & GYNECOLOGY

## 2020-10-01 PROCEDURE — 370N000003 HC ANESTHESIA WARD SERVICE

## 2020-10-01 PROCEDURE — 722N000001 HC LABOR CARE VAGINAL DELIVERY SINGLE

## 2020-10-01 PROCEDURE — 3E0R3BZ INTRODUCTION OF ANESTHETIC AGENT INTO SPINAL CANAL, PERCUTANEOUS APPROACH: ICD-10-PCS | Performed by: ANESTHESIOLOGY

## 2020-10-01 PROCEDURE — 86780 TREPONEMA PALLIDUM: CPT | Performed by: OBSTETRICS & GYNECOLOGY

## 2020-10-01 PROCEDURE — 120N000012 HC R&B POSTPARTUM

## 2020-10-01 PROCEDURE — 00HU33Z INSERTION OF INFUSION DEVICE INTO SPINAL CANAL, PERCUTANEOUS APPROACH: ICD-10-PCS | Performed by: ANESTHESIOLOGY

## 2020-10-01 PROCEDURE — 250N000009 HC RX 250: Performed by: ANESTHESIOLOGY

## 2020-10-01 PROCEDURE — 250N000011 HC RX IP 250 OP 636: Performed by: OBSTETRICS & GYNECOLOGY

## 2020-10-01 PROCEDURE — 86901 BLOOD TYPING SEROLOGIC RH(D): CPT | Performed by: OBSTETRICS & GYNECOLOGY

## 2020-10-01 PROCEDURE — 250N000009 HC RX 250: Performed by: OBSTETRICS & GYNECOLOGY

## 2020-10-01 PROCEDURE — 258N000003 HC RX IP 258 OP 636: Performed by: OBSTETRICS & GYNECOLOGY

## 2020-10-01 PROCEDURE — 250N000013 HC RX MED GY IP 250 OP 250 PS 637: Performed by: OBSTETRICS & GYNECOLOGY

## 2020-10-01 PROCEDURE — 10907ZC DRAINAGE OF AMNIOTIC FLUID, THERAPEUTIC FROM PRODUCTS OF CONCEPTION, VIA NATURAL OR ARTIFICIAL OPENING: ICD-10-PCS | Performed by: OBSTETRICS & GYNECOLOGY

## 2020-10-01 PROCEDURE — 0KQM0ZZ REPAIR PERINEUM MUSCLE, OPEN APPROACH: ICD-10-PCS | Performed by: OBSTETRICS & GYNECOLOGY

## 2020-10-01 PROCEDURE — 250N000011 HC RX IP 250 OP 636: Performed by: ANESTHESIOLOGY

## 2020-10-01 RX ORDER — LIDOCAINE HYDROCHLORIDE AND EPINEPHRINE 15; 5 MG/ML; UG/ML
3 INJECTION, SOLUTION EPIDURAL
Status: COMPLETED | OUTPATIENT
Start: 2020-10-01 | End: 2020-10-01

## 2020-10-01 RX ORDER — SODIUM CHLORIDE, SODIUM LACTATE, POTASSIUM CHLORIDE, CALCIUM CHLORIDE 600; 310; 30; 20 MG/100ML; MG/100ML; MG/100ML; MG/100ML
INJECTION, SOLUTION INTRAVENOUS CONTINUOUS
Status: DISCONTINUED | OUTPATIENT
Start: 2020-10-01 | End: 2020-10-01

## 2020-10-01 RX ORDER — MODIFIED LANOLIN
OINTMENT (GRAM) TOPICAL
Status: DISCONTINUED | OUTPATIENT
Start: 2020-10-01 | End: 2020-10-03 | Stop reason: HOSPADM

## 2020-10-01 RX ORDER — ONDANSETRON 2 MG/ML
4 INJECTION INTRAMUSCULAR; INTRAVENOUS EVERY 6 HOURS PRN
Status: DISCONTINUED | OUTPATIENT
Start: 2020-10-01 | End: 2020-10-03

## 2020-10-01 RX ORDER — OXYCODONE AND ACETAMINOPHEN 5; 325 MG/1; MG/1
1 TABLET ORAL
Status: DISCONTINUED | OUTPATIENT
Start: 2020-10-01 | End: 2020-10-01

## 2020-10-01 RX ORDER — ACETAMINOPHEN 325 MG/1
650 TABLET ORAL EVERY 4 HOURS PRN
Status: DISCONTINUED | OUTPATIENT
Start: 2020-10-01 | End: 2020-10-01

## 2020-10-01 RX ORDER — ONDANSETRON 2 MG/ML
4 INJECTION INTRAMUSCULAR; INTRAVENOUS EVERY 6 HOURS PRN
Status: DISCONTINUED | OUTPATIENT
Start: 2020-10-01 | End: 2020-10-01

## 2020-10-01 RX ORDER — NALOXONE HYDROCHLORIDE 0.4 MG/ML
.1-.4 INJECTION, SOLUTION INTRAMUSCULAR; INTRAVENOUS; SUBCUTANEOUS
Status: DISCONTINUED | OUTPATIENT
Start: 2020-10-01 | End: 2020-10-03 | Stop reason: HOSPADM

## 2020-10-01 RX ORDER — OXYTOCIN 10 [USP'U]/ML
10 INJECTION, SOLUTION INTRAMUSCULAR; INTRAVENOUS
Status: DISCONTINUED | OUTPATIENT
Start: 2020-10-01 | End: 2020-10-03 | Stop reason: HOSPADM

## 2020-10-01 RX ORDER — ACETAMINOPHEN 325 MG/1
650 TABLET ORAL EVERY 4 HOURS PRN
Status: DISCONTINUED | OUTPATIENT
Start: 2020-10-01 | End: 2020-10-03 | Stop reason: HOSPADM

## 2020-10-01 RX ORDER — TRANEXAMIC ACID 10 MG/ML
1 INJECTION, SOLUTION INTRAVENOUS EVERY 30 MIN PRN
Status: DISCONTINUED | OUTPATIENT
Start: 2020-10-01 | End: 2020-10-01

## 2020-10-01 RX ORDER — NALOXONE HYDROCHLORIDE 0.4 MG/ML
.1-.4 INJECTION, SOLUTION INTRAMUSCULAR; INTRAVENOUS; SUBCUTANEOUS
Status: DISCONTINUED | OUTPATIENT
Start: 2020-10-01 | End: 2020-10-03

## 2020-10-01 RX ORDER — TRANEXAMIC ACID 10 MG/ML
1 INJECTION, SOLUTION INTRAVENOUS EVERY 30 MIN PRN
Status: DISCONTINUED | OUTPATIENT
Start: 2020-10-01 | End: 2020-10-03 | Stop reason: HOSPADM

## 2020-10-01 RX ORDER — CEFAZOLIN SODIUM 1 G/3ML
1 INJECTION, POWDER, FOR SOLUTION INTRAMUSCULAR; INTRAVENOUS ONCE
Status: COMPLETED | OUTPATIENT
Start: 2020-10-01 | End: 2020-10-01

## 2020-10-01 RX ORDER — OXYTOCIN/0.9 % SODIUM CHLORIDE 30/500 ML
100 PLASTIC BAG, INJECTION (ML) INTRAVENOUS CONTINUOUS
Status: DISCONTINUED | OUTPATIENT
Start: 2020-10-01 | End: 2020-10-03 | Stop reason: HOSPADM

## 2020-10-01 RX ORDER — METHYLERGONOVINE MALEATE 0.2 MG/ML
200 INJECTION INTRAVENOUS
Status: DISCONTINUED | OUTPATIENT
Start: 2020-10-01 | End: 2020-10-03 | Stop reason: HOSPADM

## 2020-10-01 RX ORDER — BISACODYL 10 MG
10 SUPPOSITORY, RECTAL RECTAL DAILY PRN
Status: DISCONTINUED | OUTPATIENT
Start: 2020-10-03 | End: 2020-10-03 | Stop reason: HOSPADM

## 2020-10-01 RX ORDER — OXYTOCIN 10 [USP'U]/ML
10 INJECTION, SOLUTION INTRAMUSCULAR; INTRAVENOUS
Status: DISCONTINUED | OUTPATIENT
Start: 2020-10-01 | End: 2020-10-01

## 2020-10-01 RX ORDER — ROPIVACAINE HYDROCHLORIDE 2 MG/ML
10 INJECTION, SOLUTION EPIDURAL; INFILTRATION; PERINEURAL ONCE
Status: COMPLETED | OUTPATIENT
Start: 2020-10-01 | End: 2020-10-01

## 2020-10-01 RX ORDER — OXYCODONE HYDROCHLORIDE 5 MG/1
5 TABLET ORAL EVERY 4 HOURS PRN
Status: DISCONTINUED | OUTPATIENT
Start: 2020-10-01 | End: 2020-10-03 | Stop reason: HOSPADM

## 2020-10-01 RX ORDER — OXYTOCIN/0.9 % SODIUM CHLORIDE 30/500 ML
1-24 PLASTIC BAG, INJECTION (ML) INTRAVENOUS CONTINUOUS
Status: DISCONTINUED | OUTPATIENT
Start: 2020-10-01 | End: 2020-10-03 | Stop reason: HOSPADM

## 2020-10-01 RX ORDER — ONDANSETRON 4 MG/1
4 TABLET, ORALLY DISINTEGRATING ORAL EVERY 6 HOURS PRN
Status: DISCONTINUED | OUTPATIENT
Start: 2020-10-01 | End: 2020-10-03

## 2020-10-01 RX ORDER — FENTANYL CITRATE 50 UG/ML
100 INJECTION, SOLUTION INTRAMUSCULAR; INTRAVENOUS ONCE
Status: COMPLETED | OUTPATIENT
Start: 2020-10-01 | End: 2020-10-01

## 2020-10-01 RX ORDER — NALOXONE HYDROCHLORIDE 0.4 MG/ML
.1-.4 INJECTION, SOLUTION INTRAMUSCULAR; INTRAVENOUS; SUBCUTANEOUS
Status: DISCONTINUED | OUTPATIENT
Start: 2020-10-01 | End: 2020-10-01

## 2020-10-01 RX ORDER — EPHEDRINE SULFATE 50 MG/ML
5 INJECTION, SOLUTION INTRAMUSCULAR; INTRAVENOUS; SUBCUTANEOUS
Status: DISCONTINUED | OUTPATIENT
Start: 2020-10-01 | End: 2020-10-03

## 2020-10-01 RX ORDER — HYDROCORTISONE 2.5 %
CREAM (GRAM) TOPICAL 3 TIMES DAILY PRN
Status: DISCONTINUED | OUTPATIENT
Start: 2020-10-01 | End: 2020-10-03 | Stop reason: HOSPADM

## 2020-10-01 RX ORDER — OXYTOCIN/0.9 % SODIUM CHLORIDE 30/500 ML
340 PLASTIC BAG, INJECTION (ML) INTRAVENOUS CONTINUOUS PRN
Status: DISCONTINUED | OUTPATIENT
Start: 2020-10-01 | End: 2020-10-03 | Stop reason: HOSPADM

## 2020-10-01 RX ORDER — AMOXICILLIN 250 MG
1 CAPSULE ORAL 2 TIMES DAILY
Status: DISCONTINUED | OUTPATIENT
Start: 2020-10-01 | End: 2020-10-03 | Stop reason: HOSPADM

## 2020-10-01 RX ORDER — CARBOPROST TROMETHAMINE 250 UG/ML
250 INJECTION, SOLUTION INTRAMUSCULAR
Status: DISCONTINUED | OUTPATIENT
Start: 2020-10-01 | End: 2020-10-03 | Stop reason: HOSPADM

## 2020-10-01 RX ORDER — NALBUPHINE HYDROCHLORIDE 10 MG/ML
2.5-5 INJECTION, SOLUTION INTRAMUSCULAR; INTRAVENOUS; SUBCUTANEOUS EVERY 6 HOURS PRN
Status: DISCONTINUED | OUTPATIENT
Start: 2020-10-01 | End: 2020-10-03

## 2020-10-01 RX ORDER — LIDOCAINE 40 MG/G
CREAM TOPICAL
Status: DISCONTINUED | OUTPATIENT
Start: 2020-10-01 | End: 2020-10-01

## 2020-10-01 RX ORDER — AMOXICILLIN 250 MG
2 CAPSULE ORAL 2 TIMES DAILY
Status: DISCONTINUED | OUTPATIENT
Start: 2020-10-01 | End: 2020-10-03 | Stop reason: HOSPADM

## 2020-10-01 RX ORDER — OXYTOCIN/0.9 % SODIUM CHLORIDE 30/500 ML
100-340 PLASTIC BAG, INJECTION (ML) INTRAVENOUS CONTINUOUS PRN
Status: COMPLETED | OUTPATIENT
Start: 2020-10-01 | End: 2020-10-01

## 2020-10-01 RX ADMIN — ACETAMINOPHEN 650 MG: 325 TABLET, FILM COATED ORAL at 19:55

## 2020-10-01 RX ADMIN — ROPIVACAINE HYDROCHLORIDE 10 ML: 2 INJECTION, SOLUTION EPIDURAL; INFILTRATION at 14:50

## 2020-10-01 RX ADMIN — FENTANYL CITRATE 100 MCG: 50 INJECTION, SOLUTION INTRAMUSCULAR; INTRAVENOUS at 14:50

## 2020-10-01 RX ADMIN — Medication 340 ML/HR: at 18:11

## 2020-10-01 RX ADMIN — ACETAMINOPHEN 650 MG: 325 TABLET, FILM COATED ORAL at 21:20

## 2020-10-01 RX ADMIN — CEFAZOLIN 1 G: 1 INJECTION, POWDER, FOR SOLUTION INTRAMUSCULAR; INTRAVENOUS at 19:35

## 2020-10-01 RX ADMIN — Medication 1 MILLI-UNITS/MIN: at 13:22

## 2020-10-01 RX ADMIN — DOCUSATE SODIUM 50 MG AND SENNOSIDES 8.6 MG 1 TABLET: 8.6; 5 TABLET, FILM COATED ORAL at 21:20

## 2020-10-01 RX ADMIN — LIDOCAINE HYDROCHLORIDE AND EPINEPHRINE 2 ML: 15; 5 INJECTION, SOLUTION EPIDURAL at 14:51

## 2020-10-01 RX ADMIN — LIDOCAINE HYDROCHLORIDE AND EPINEPHRINE 3 ML: 15; 5 INJECTION, SOLUTION EPIDURAL at 14:50

## 2020-10-01 RX ADMIN — SODIUM CHLORIDE, POTASSIUM CHLORIDE, SODIUM LACTATE AND CALCIUM CHLORIDE: 600; 310; 30; 20 INJECTION, SOLUTION INTRAVENOUS at 13:10

## 2020-10-01 RX ADMIN — Medication 12 ML/HR: at 15:04

## 2020-10-01 RX ADMIN — SODIUM CHLORIDE, POTASSIUM CHLORIDE, SODIUM LACTATE AND CALCIUM CHLORIDE: 600; 310; 30; 20 INJECTION, SOLUTION INTRAVENOUS at 16:30

## 2020-10-01 NOTE — ANESTHESIA PREPROCEDURE EVALUATION
"Anesthesia Pre-Procedure Evaluation    Patient: Bonnie Welch   MRN: [id number] : 1980          Preoperative Diagnosis: * No surgery found *        Past Medical History:   Diagnosis Date     Kidney stones      &      History reviewed. No pertinent surgical history.    Anesthesia Evaluation     .             ROS/MED HX    ENT/Pulmonary:       Neurologic:       Cardiovascular:         METS/Exercise Tolerance:     Hematologic:         Musculoskeletal:         GI/Hepatic:         Renal/Genitourinary:     (+) Nephrolithiasis ,       Endo:         Psychiatric:         Infectious Disease:         Malignancy:         Other:                                 Lab Results   Component Value Date    WBC 11.9 (H) 2019    HGB 11.4 (L) 2019    HCT 33.5 (L) 2019     2019       Preop Vitals  BP Readings from Last 3 Encounters:   10/01/20 125/82   20 135/77   19 144/80    Pulse Readings from Last 3 Encounters:   20 89   19 82      Resp Readings from Last 3 Encounters:   10/01/20 (P) 16   20 16   19 16    SpO2 Readings from Last 3 Encounters:   19 98%      Temp Readings from Last 1 Encounters:   10/01/20 37.8  C (100  F) ((P) Temporal)    Ht Readings from Last 1 Encounters:   20 1.626 m (5' 4\")      Wt Readings from Last 1 Encounters:   20 62.1 kg (137 lb)    Estimated body mass index is 23.52 kg/m  as calculated from the following:    Height as of 20: 1.626 m (5' 4\").    Weight as of 20: 62.1 kg (137 lb).       Anesthesia Plan      History & Physical Review  History and physical reviewed and following examination; no interval change.    ASA Status:  2 .        Plan for Epidural            Postoperative Care      Consents  Anesthetic plan, risks, benefits and alternatives discussed with:  Patient..                 Tiago Ga DO  "

## 2020-10-01 NOTE — L&D DELIVERY NOTE
OB Vaginal Delivery Note    Bonnie Welch MRN# [id number]   Age: 40 year old YOB: 1980       GA: 39w2d  GP:   Labor Complications:    EBL: 300  mL  Delivery QBL:    Delivery Type: Induction of Labor   ROM to Delivery Time: (Delivered) (P)  Hours: 5 Minutes: 23  [place] Weight: 2.91 kg (6 lb 6.7 oz)    1 Minute 5 Minute 10 Minute   Apgar Totals: 9   9        FINA BUTLER     Delivery Details:  Patient presented for elective IOL secondary to term gestation, advanced cervical dilation. Pregnancy complicated by AMA, history or  delivery on weekly IM Progesterone from 16-36 weeks. Previously breech, successful ECV @37 weeks.  Bonnie Welch, a 40 year old  female delivered a viable infant with apgars of 9  and 9 . Patient was fully dilated and pushing after   hours   minutes in active labor. Delivery was via induction of labor  to a sterile field under epidural  anesthesia. Infant delivered in vertex    occiput  anterior  position. Anterior and posterior shoulders delivered without difficulty. The cord was clamped, cut twice and 3 vessels  were noted. Cord blood was obtained in routine fashion with the following disposition: lab .      Cord complications: none   Placenta delivered at 10/1/2020  6:09 PM . Placental disposition was Hospital disposal . Fundal massage performed and fundus found to be firm.     Episiotomy: none    Perineum, vagina, cervix were inspected, and the following lacerations were noted:   Perineal lacerations: midline  2nd  degree laceration noted. Repaired in standard fashion with 3.0 vicryl. After repair completed, patient stating that she did have issues with repair healing after previous delivery. Vicryl suture removed and laceration repaired with 2.0 monocryl. Adequate hemostasis noted. One dose IV Ancef given for prophylaxis.            .    Excellent hemostasis was noted. Needle count correct. Infant and patient in delivery room in good and  stable condition.        Jamal Welch [[id number]]    Labor Event Times    Labor onset date: 10/1/20 Onset time:  2:00 PM   Dilation complete date: 10/1/20 Complete time:  5:20 PM      Labor Events    Labor Type: Induction/Cervical ripening     Rupture date/time: 10/1/20 1243   Rupture type: Artificial Rupture of Membranes  Fluid color: Clear  Fluid odor: Normal     Induction: Oxytocin, AROM  Induction date/time: 10/1/20 1243   Cervical ripening date/time:     Indications for induction: Elective     1:1 continuous labor support provided by?: RN       Delivery/Placenta Date and Time    Delivery Date: 10/1/20 Delivery Time:  6:06 PM   Placenta Date/Time: 10/1/2020  6:09 PM     Vaginal Counts     Initial count performed by 2 team members:  Two Team Members   Dr Juan C Blankenship       Needles Suture Portal Sponges Instruments   Initial counts 2  5    Added to count  2     Final counts       Placed during labor Accounted for at the end of labor    Final count performed by 2 team members:  Two Team Members   Dr Juan C Blankenship         Apgars    Living status: Living   1 Minute 5 Minute 10 Minute 15 Minute 20 Minute   Skin color: 1  1       Heart rate: 2  2       Reflex irritability: 2  2       Muscle tone: 2  2       Respiratory effort: 2  2       Total: 9  9       Apgars assigned by: FINA BUTLER     Cord    Vessels: 3 Vessels Complications: None   Cord Blood Disposition: Lab Gases Sent?: No      Stephan Resuscitation    Methods: None      Measurements    Weight: 6 lb 6.7 oz       Skin to Skin and Feeding Plan    Skin to skin initiation date/time: 1/10/1841    Skin to skin with: Mother  Skin to skin end date/time:     How do you plan to feed your baby: Breastfeeding     Delivery (Maternal) (Provider to Complete) (499011)    Episiotomy: None  Perineal lacerations: 2nd Repaired?: Yes   Est. blood loss (mL): 300     Blood Loss  Mother: Bonnie Welch #[id number]   Start of Mother's  Information    IO Blood Loss  10/01/20 1400 - 10/01/20 1848    EBL (mL) Hospital Encounter 300 mL    Total  300 mL         End of Mother's Information  Mother: Bonnie Welch #[id number]          Delivery - Provider to Complete ([id number])    Delivering clinician: Laura Irizarry MD  Attempted Delivery Types (Choose all that apply): Spontaneous Vaginal Delivery  Delivery Type (Choose the 1 that will go to the Birth History): Induction of Labor                   Other personnel:  Provider Role   Laura Lira RN                 Placenta    Delayed Cord Clamping: Done  Date/Time: 10/1/2020  6:09 PM  Removal: Spontaneous  Disposition: Hospital disposal           Anesthesia    Method: Epidural  Cervical dilation at placement: 4-7                Presentation and Position    Presentation: Vertex     Occiput Anterior                 Laura Irizarry MD, MD

## 2020-10-01 NOTE — PROGRESS NOTES
OB  Patient is 39 yo  @ 39 2/7 weeks presented for elective IOL. Overall feeling well, noting good FM.    Cx:5/50/-2, vtx  AROM performed demonstrating clear fluid  FHT baseline 130, moderate variability, accels noted  Ctxns: infrequent    A/P: 39 yo  @ 39 2/7 weeks, GBS neg, here for elective IOL  AROM completed  Begin IV pitocin.  Positional changes encouraged to assist with fetal descent.  Patient may have epidural for pain relief when desired.  Laura Irizarry MD, MD on 10/1/2020 at 12:59 PM

## 2020-10-01 NOTE — PROGRESS NOTES
OB  Patient is comfortable with after epidural placement  /73   Temp (P) 98.2  F (36.8  C) (Temporal)   Resp (P) 16   LMP 2019   SpO2 96%   Breastfeeding No      Cx /-2 per recent RN exam  FHT baseline 130, moderate variability, cat 1  Ctxns slightly irregular, pitocin @ 8mu    A/P: 39 yo  @ 39 2/7 weeks, GBS neg, IOL  Continue pitocin  Positional changes to assist with fetal descent  Laura Irizarry MD, MD on 10/1/2020 at 4:16 PM

## 2020-10-01 NOTE — ANESTHESIA PROCEDURE NOTES
Procedure note : epidural catheter      Staff -   Anesthesiologist:  Tiago Ga DO  Performed By: anesthesiologist  Pre-Procedure  Performed by Tiago Ga DO  Location: OB      Pre-Anesthestic Checklist: patient identified, IV checked, site marked, risks and benefits discussed, informed consent, monitors and equipment checked, pre-op evaluation and at physician/surgeon's request    Timeout  Correct Patient: Yes   Correct Procedure: Yes   Correct Site: Yes   Correct Laterality: Yes   Correct Position: Yes   Site Marked: Yes   .   Procedure Documentation    .    Procedure: epidural catheter, .   Patient Position:sitting Insertion Site:L2-3  (midline approach) Injection technique: LORT saline and LORT air   Local skin infiltrated with 1 mL of 1% lidocaine.      Patient Prep/Sterile Barriers; povidone-iodine 7.5% surgical scrub.  .  Needle: Touhy needle   Needle Gauge: 17.    Needle Length (Inches) 3.5   # of attempts: 1 and # of redirects:  .    Catheter: 19 G . .  Catheter threaded easily  .  .   .    Assessment/Narrative  Paresthesias: No.  .  .  Aspiration negative for heme or CSF  . Test dose of 3 mL lidocaine 1.5% w/ 1:200,000 epinephrine at. Test dose negative for signs of intravascular, subdural or intrathecal injection. Comments:  Pre-procedure time out completed with L&D nursing staff. Patient in seated position on side of bed, the lumbar spine was prepped and draped in sterile fashion. The L2/L3 interspace was identified and local anesthetic was injected for local skin infiltration. A 17 G touhy needle was advanced to the epidural space which was confirmed with the loss of resistance technique at 4 cm. A catheter was then advanced easily into the epidural space. The catheter was left at 9 cm at the skin. No aspiration of blood or CSF. 3 mL test dose of 1.5% lidocaine with 1:200,000 epinephrine was injected through the catheter and was negative for intravascular injection. The site was  covered with sterile tegaderm and the catheter was secured with tape.

## 2020-10-01 NOTE — H&P
"  2020    Bonnie Dennis  [id number]            OB Admit History & Physical      Ms. Dennis  is here for planned IOL.    She has noticed mild contractions since last night with increase in vaginal discharge-suspects loss of mucus plug.    Patient's last menstrual period was 2019.   Her Estimated Date of Delivery: Oct 6, 2020  , making her 39w2d  wks.      Estimated body mass index is 23.52 kg/m  as calculated from the following:    Height as of 20: 1.626 m (5' 4\").    Weight as of 20: 62.1 kg (137 lb).  Her prenatal course has been  complicated by breech presentation with increased RADHA.  Patient is s/p successful ECV on . Vtx presentation confirmed by ultrasound yesterday. Patient noting more pelvic pressure since ECV. Pregnancy also complicated by AMA, history of previous PPROM, PTL,  delivery with her first pregnancy. This pregnancy, she has received IM Progesterone weekly from 16-36 weeks. Patient has also received BMZ for early cervical dilation @32 weeks.  See prenatal for labs.  neg GBBS, Rubella  Immune, RH Positive    Estimated fetal weight= 7 1/2 lbs        She is a 40 year old   Her OB history:   OB History    Para Term  AB Living   3 1 0 1 1 1   SAB TAB Ectopic Multiple Live Births   0 0 0 0 1      # Outcome Date GA Lbr Morales/2nd Weight Sex Delivery Anes PTL Lv   3 Current            2  19 34w0d 04:13 / 00:48 2.08 kg (4 lb 9.4 oz) M Vag-Spont EPI, Nitrous Y DARRION      Complications: Prolonged PROM (>18 hours),  premature rupture of membranes (PPROM) with unknown onset of labor      Name: JESSICA DENNIS      Apgar1: 6  Apgar5: 8   1 AB                     Past Medical History:   Diagnosis Date     Kidney stones      &         No past surgical history on file.      No current outpatient medications on file.       Allergies: Aspirin and Ibuprofen      REVIEW OF SYSTEMS:  NEUROLOGIC:  Negative  EYES:  Negative  ENT:  " Negative  GI:  Negative  BREAST:  Negative  :  Negative  GYN:  Negative  CV:  Negative  PULMONARY:  Negative  MUSCULOSKELETAL:  Negative  PSYCH:  Negative        Social History     Socioeconomic History     Marital status:      Spouse name: Not on file     Number of children: Not on file     Years of education: Not on file     Highest education level: Not on file   Occupational History     Not on file   Social Needs     Financial resource strain: Not on file     Food insecurity     Worry: Not on file     Inability: Not on file     Transportation needs     Medical: Not on file     Non-medical: Not on file   Tobacco Use     Smoking status: Never Smoker     Smokeless tobacco: Never Used   Substance and Sexual Activity     Alcohol use: Not Currently     Drug use: Never     Sexual activity: Yes     Partners: Male   Lifestyle     Physical activity     Days per week: Not on file     Minutes per session: Not on file     Stress: Not on file   Relationships     Social connections     Talks on phone: Not on file     Gets together: Not on file     Attends Taoism service: Not on file     Active member of club or organization: Not on file     Attends meetings of clubs or organizations: Not on file     Relationship status: Not on file     Intimate partner violence     Fear of current or ex partner: Not on file     Emotionally abused: Not on file     Physically abused: Not on file     Forced sexual activity: Not on file   Other Topics Concern     Not on file   Social History Narrative     Not on file      No family history on file.          Vitals:     With contractions every  Irregular     Alert Awake in NAD  HEENT grossly normal  Neck: no lymphadenopathy or thryoidomegaly  Lungs CTA bilaterally  Back  spinal or CVAT  Heart RRR  ABD gravid,   Pelvic:   fluid noted,  blood noted  Cervix is 5 cm / 50 % effaced at -2 station  EXT:   edema or calf tenderness  Neuro:      Assessment: 39 yo  at 39w2d, advanced  cervical dilation, term gestation.    Plan:  Admit patient for elective IOL.  AROM and IV pitocin planned.  COVID screening is negative.  Epidural when desired for pain relief.      [unfilled]      Laura Irizarry MD, MD [facility]  Dept of OB/GYN  October 1, 2020

## 2020-10-02 LAB — T PALLIDUM AB SER QL: NONREACTIVE

## 2020-10-02 PROCEDURE — 120N000012 HC R&B POSTPARTUM

## 2020-10-02 PROCEDURE — 250N000013 HC RX MED GY IP 250 OP 250 PS 637: Performed by: OBSTETRICS & GYNECOLOGY

## 2020-10-02 RX ADMIN — ACETAMINOPHEN 650 MG: 325 TABLET, FILM COATED ORAL at 01:20

## 2020-10-02 RX ADMIN — ACETAMINOPHEN 650 MG: 325 TABLET, FILM COATED ORAL at 16:53

## 2020-10-02 RX ADMIN — OXYCODONE HYDROCHLORIDE 5 MG: 5 TABLET ORAL at 08:44

## 2020-10-02 RX ADMIN — OXYCODONE HYDROCHLORIDE 5 MG: 5 TABLET ORAL at 16:54

## 2020-10-02 RX ADMIN — ACETAMINOPHEN 650 MG: 325 TABLET, FILM COATED ORAL at 20:33

## 2020-10-02 RX ADMIN — DOCUSATE SODIUM 50 MG AND SENNOSIDES 8.6 MG 1 TABLET: 8.6; 5 TABLET, FILM COATED ORAL at 20:34

## 2020-10-02 RX ADMIN — ACETAMINOPHEN 650 MG: 325 TABLET, FILM COATED ORAL at 13:03

## 2020-10-02 RX ADMIN — ACETAMINOPHEN 650 MG: 325 TABLET, FILM COATED ORAL at 05:20

## 2020-10-02 RX ADMIN — DOCUSATE SODIUM 50 MG AND SENNOSIDES 8.6 MG 1 TABLET: 8.6; 5 TABLET, FILM COATED ORAL at 08:44

## 2020-10-02 RX ADMIN — OXYCODONE HYDROCHLORIDE 5 MG: 5 TABLET ORAL at 01:20

## 2020-10-02 NOTE — PLAN OF CARE
Vital signs stable. Postpartum assessment WDL. Pain controlled with tylenol and 5 mg of oxycodone. Patient up ad nancy and voiding without difficulty. IV removed. Breastfeeding on cue independently/with minimal staff assistance with positioning/latch. Patient and infant bonding well. Will continue with current plan of care.

## 2020-10-02 NOTE — PLAN OF CARE
VSS.Was shivering on arrival to PP from labor. Warm blankets applied and resolved quickly. Declined nausea med, resolving. Tylenol providing adequate pain control for perineal discomfort. Also using ice and tucks pads. Lexie. Reg diet.after nausea and vomiting after delivery.IV S.L. Void x 2.Declined flu vaccine.Breast feeds going well with help to latch & position. First child was in NICU.Pt jose Rodney is support person and is post op 2 wks from open heart surgery.

## 2020-10-02 NOTE — ANESTHESIA POSTPROCEDURE EVALUATION
Patient: Bonnie Welch    * No procedures listed *    Diagnosis:* No pre-op diagnosis entered *  Diagnosis Additional Information: No value filed.    Anesthesia Type:  No value filed.    Note:  Anesthesia Post Evaluation    Patient location during evaluation: Bedside     Anesthetic complications: None    Comments: The patient was satisfied with her labor epidural and had no complaints. She is able to ambulate and denies urinary or bowel complaints.         Last vitals:  Vitals:    10/02/20 0500 10/02/20 0830 10/02/20 1554   BP: 115/69 135/81 109/63   Pulse: 69 70 64   Resp: 16 16 16   Temp: 36.5  C (97.7  F) 36.4  C (97.6  F) 36.9  C (98.4  F)   SpO2:            Electronically Signed By: Marcos Lira MD  October 2, 2020  4:27 PM

## 2020-10-02 NOTE — PLAN OF CARE
Data: Vital signs within normal limits. Postpartum checks within normal limits - see flow record. Patient eating and drinking normally. Patient able to empty bladder independently and is up ambulating. No apparent signs of infection. Episiotomy healing well. Patient performing self cares and is able to care for infant.  Action: Patient medicated during the shift for cramping. See MAR. Patient reassessed within 1 hour after each medication and pain was improved - patient stated she was comfortable. Patient education done about medication and 24 hour cares. See flow record.  Response: Positive attachment behaviors observed with infant. Support persons are present.   Plan: Anticipate discharge on 10/3/2020.

## 2020-10-02 NOTE — PLAN OF CARE
Data: Bonnie Welch transferred to [facility] via wheelchair at 2050. Baby transferred via crib.  Action: Receiving unit notified of transfer: Yes. Patient and family notified of room change. Report given to Noemí at 2050. Belongings sent to receiving unit. Accompanied by Registered Nurse. Oriented patient to surroundings. Call light within reach. ID bands double-checked with receiving RN.  Response: Patient tolerated transfer and is stable.

## 2020-10-03 VITALS
TEMPERATURE: 97.8 F | DIASTOLIC BLOOD PRESSURE: 76 MMHG | SYSTOLIC BLOOD PRESSURE: 122 MMHG | HEART RATE: 79 BPM | RESPIRATION RATE: 16 BRPM | OXYGEN SATURATION: 100 %

## 2020-10-03 PROCEDURE — 250N000013 HC RX MED GY IP 250 OP 250 PS 637: Performed by: OBSTETRICS & GYNECOLOGY

## 2020-10-03 RX ORDER — ACETAMINOPHEN 325 MG/1
650 TABLET ORAL EVERY 4 HOURS PRN
Status: ON HOLD
Start: 2020-10-03 | End: 2023-03-27

## 2020-10-03 RX ORDER — AMOXICILLIN 250 MG
1-2 CAPSULE ORAL 2 TIMES DAILY PRN
Status: ON HOLD
Start: 2020-10-03 | End: 2023-03-27

## 2020-10-03 RX ADMIN — DOCUSATE SODIUM 50 MG AND SENNOSIDES 8.6 MG 1 TABLET: 8.6; 5 TABLET, FILM COATED ORAL at 09:49

## 2020-10-03 RX ADMIN — ACETAMINOPHEN 650 MG: 325 TABLET, FILM COATED ORAL at 01:32

## 2020-10-03 RX ADMIN — ACETAMINOPHEN 650 MG: 325 TABLET, FILM COATED ORAL at 07:17

## 2020-10-03 ASSESSMENT — ACTIVITIES OF DAILY LIVING (ADL)
DOING_ERRANDS_INDEPENDENTLY_DIFFICULTY: NO
TOILETING_ISSUES: NO
DIFFICULTY_COMMUNICATING: NO
DIFFICULTY_EATING/SWALLOWING: NO
DRESSING/BATHING_DIFFICULTY: NO
WEAR_GLASSES_OR_BLIND: YES
WALKING_OR_CLIMBING_STAIRS_DIFFICULTY: NO
FALL_HISTORY_WITHIN_LAST_SIX_MONTHS: NO
CONCENTRATING,_REMEMBERING_OR_MAKING_DECISIONS_DIFFICULTY: NO

## 2020-10-03 NOTE — PROGRESS NOTES
[facility] Obstetrics Post-Partum Progress Note          Assessment and Plan:    Assessment:   Post-partum day #2  Normal spontaneous vaginal delivery  L&D complications: None      Doing well.  No excessive bleeding  Pain well-controlled.      Plan:   Discharge later today           Interval History:   Doing well.  Pain is adequately controlled.  No fevers.  No history of foul-smelling vaginal discharge.  Good appetite.  Denies chest pain, shortness of breath, nausea or vomiting.  Vaginal bleeding is similar to a heavy menstrual flow.  Breastfeeding well.          Significant Problems:    None          Review of Systems:    The patient denies any chest pain, shortness of breath, excessive pain, fever, chills, purulent drainage from the wound, nausea or vomiting.          Medications:   All medications related to the patient's surgery have been reviewed          Physical Exam:     All vitals stable  Patient Vitals for the past 24 hrs:   BP Temp Temp src Pulse Resp   10/03/20 0132 121/78 98.3  F (36.8  C) Oral 67 16   10/02/20 1554 109/63 98.4  F (36.9  C) -- 64 16     Uterine fundus is firm, non-tender and at the level of the umbilicus          Data:     All laboratory data related to this surgery reviewed  Hemoglobin   Date Value Ref Range Status   01/14/2019 11.4 (L) 11.7 - 15.7 g/dL Final   01/11/2019 12.3 11.7 - 15.7 g/dL Final     No imaging studies have been ordered    Dustin Dennis MD

## 2020-10-03 NOTE — PLAN OF CARE
D: VSS, assessments WDL.   I: Pt. received complete discharge paperwork.  Pt. was given times of last dose for all discharge medications in writing on discharge medication sheets.  Discharge teaching included home medication, pain management, activity restrictions, postpartum cares, and signs and symptoms of infection.    A: Discharge outcomes on care plan met.  Mother states understanding and comfort with self care and follow up care.   P: Pt. Discharged.  Pt. was accompanied by  and infant and left with personal belongings.  Home care referral sent.  Pt. to follow up with OB provider per discharge instructions.  Pt. had no further questions at the time of discharge and no unmet needs were identified.

## 2020-10-03 NOTE — LACTATION NOTE
Brief discharge visit with Bonnie, FOB, and infant. Bonnie shares she is very happy with how infant is breastfeeding but had questions about different positions. Since family was getting ready to discharge, LC directed Bonnie to Postpartum and [place] Guide handbook, suggested she download the kindra and watch the videos associated with breast feeding positions.    Suggested lactation follow up at their Pediatric office.    Danielle Carter RN  Lactation Educator

## 2020-10-03 NOTE — PLAN OF CARE
Patient doing well.  Vital signs stable.  Fundus firm at U/1. Scant rubra flow.  Using ice/tucks prn to perineum.  Voiding without difficulty.  Showered.  Breast feeding baby girl every 2 to 3 hours independently with good latch observed.  Taking tylenol and oxycodone prn with good effect.

## 2020-10-03 NOTE — DISCHARGE SUMMARY
[facility] Discharge Summary    Bonnie Welch MRN# [id number]   Age: 40 year old YOB: 1980     Date of Admission:  10/1/2020  Date of Discharge::  10/3/2020  Admitting Physician:  Laura Irizarry MD  Discharge Physician:  Dustin Dennis MD     Home clinic: [facility]          Admission Diagnoses:   Pregnancy at 39 weeks  Elective induction of labor          Discharge Diagnosis:   Normal spontaneous vaginal delivery          Procedures:   Procedure(s): Repair of second degree perineal laceration       No other procedures performed during this admission           Medications Prior to Admission:     Medications Prior to Admission   Medication Sig Dispense Refill Last Dose     Ascorbic Acid (VITAMIN C) 500 MG CAPS Take 1,000 mg by mouth   10/1/2020 at Unknown time     calcium-magnesium (CALMAG) 500-250 MG TABS per tablet Take 1 tablet by mouth daily   9/30/2020 at Unknown time     multivitamin w/minerals (MULTI-VITAMIN) tablet Take 1 tablet by mouth daily   9/30/2020 at Unknown time     Vitamin D, Cholecalciferol, 25 MCG (1000 UT) CAPS    9/30/2020 at Unknown time             Discharge Medications:     Current Discharge Medication List      START taking these medications    Details   acetaminophen (TYLENOL) 325 MG tablet Take 2 tablets (650 mg) by mouth every 4 hours as needed for mild pain or fever (greater than or equal to 38  C /100.4  F (oral) or 38.5  C/ 101.4  F (core).)  Qty:      Associated Diagnoses: Normal vaginal delivery      senna-docusate (SENOKOT-S/PERICOLACE) 8.6-50 MG tablet Take 1-2 tablets by mouth 2 times daily as needed for constipation    Associated Diagnoses: Normal vaginal delivery         CONTINUE these medications which have NOT CHANGED    Details   Ascorbic Acid (VITAMIN C) 500 MG CAPS Take 1,000 mg by mouth      calcium-magnesium (CALMAG) 500-250 MG TABS per tablet Take 1 tablet by mouth daily      multivitamin w/minerals (MULTI-VITAMIN) tablet  Take 1 tablet by mouth daily      Vitamin D, Cholecalciferol, 25 MCG (1000 UT) CAPS                    Consultations:   No consultations were requested during this admission          Brief History of Labor:   Admitted at 39 weeks gestation for elective induction of labor secondary to term pregnancy, favorable cervix and multiparity. Uncomplicated labor and delivery. See delivery note for details.           Hospital Course:   The patient's hospital course was unremarkable.  On discharge, her pain was well controlled. Vaginal bleeding is similar to peak menstrual flow.  Voiding without difficulty.  Ambulating well and tolerating a normal diet.  No fever.  Breastfeeding well.  Infant is stable.  No bowel movement yet.*  She was discharged on post-partum day #2.    Post-partum hemoglobin:   Hemoglobin   Date Value Ref Range Status   01/14/2019 11.4 (L) 11.7 - 15.7 g/dL Final             Discharge Instructions and Follow-Up:   Discharge diet: Regular   Discharge activity: Pelvic rest: abstain from intercourse and do not use tampons for 6 week(s)   Discharge follow-up: Follow up with Dr. Irizarry in 6 weeks   Wound care: Drink plenty of fluids  Ice to area for comfort           Discharge Disposition:   Discharged to home      Attestation:  Total time: 30 minutes    Dustin Dennis MD

## 2020-10-03 NOTE — PLAN OF CARE
Vital signs stable. Postpartum assessment WDL. Pain controlled with tylenol; no need for oxycodone this shift. Discussed non phaarmacological methods of pain control. Patient voiding without difficulty. Breastfeeding on cue independently. First infant was not able to breastfeed so Bonnie pumped and bottle fed for 6 months. Parents relieved this baby is latching eagerly. Patient and infant bonding well. Plan for discharge today.

## 2020-10-03 NOTE — PLAN OF CARE
Vital signs stable. Postpartum assessment WDL. Pain controlled with tylenol, denying need for oxycodone overnight. Patient voiding without difficulty. Breastfeeding on cue with minimal assist. Patient and infant bonding well. Will continue with current plan of care, plan to discharge today.

## 2021-01-03 ENCOUNTER — HEALTH MAINTENANCE LETTER (OUTPATIENT)
Age: 41
End: 2021-01-03

## 2021-04-25 ENCOUNTER — HEALTH MAINTENANCE LETTER (OUTPATIENT)
Age: 41
End: 2021-04-25

## 2021-10-10 ENCOUNTER — HEALTH MAINTENANCE LETTER (OUTPATIENT)
Age: 41
End: 2021-10-10

## 2022-05-08 ENCOUNTER — APPOINTMENT (OUTPATIENT)
Dept: CT IMAGING | Facility: CLINIC | Age: 42
End: 2022-05-08
Attending: EMERGENCY MEDICINE
Payer: COMMERCIAL

## 2022-05-08 ENCOUNTER — HOSPITAL ENCOUNTER (EMERGENCY)
Facility: CLINIC | Age: 42
Discharge: HOME | End: 2022-05-08
Attending: EMERGENCY MEDICINE | Admitting: EMERGENCY MEDICINE
Payer: COMMERCIAL

## 2022-05-08 VITALS
RESPIRATION RATE: 18 BRPM | OXYGEN SATURATION: 100 % | HEIGHT: 64 IN | WEIGHT: 120 LBS | SYSTOLIC BLOOD PRESSURE: 127 MMHG | BODY MASS INDEX: 20.49 KG/M2 | HEART RATE: 94 BPM | DIASTOLIC BLOOD PRESSURE: 78 MMHG | TEMPERATURE: 98.2 F

## 2022-05-08 DIAGNOSIS — N20.0 KIDNEY STONE: ICD-10-CM

## 2022-05-08 LAB
ALBUMIN SERPL-MCNC: 4 G/DL (ref 3.4–5)
ALBUMIN UR-MCNC: 100 MG/DL
ALP SERPL-CCNC: 62 U/L (ref 40–150)
ALT SERPL W P-5'-P-CCNC: 16 U/L (ref 0–50)
ANION GAP SERPL CALCULATED.3IONS-SCNC: 7 MMOL/L (ref 3–14)
APPEARANCE UR: ABNORMAL
AST SERPL W P-5'-P-CCNC: 12 U/L (ref 0–45)
BACTERIA #/AREA URNS HPF: ABNORMAL /HPF
BASOPHILS # BLD AUTO: 0.1 10E3/UL (ref 0–0.2)
BASOPHILS NFR BLD AUTO: 1 %
BILIRUB SERPL-MCNC: 0.6 MG/DL (ref 0.2–1.3)
BILIRUB UR QL STRIP: NEGATIVE
BUN SERPL-MCNC: 9 MG/DL (ref 7–30)
CALCIUM SERPL-MCNC: 8.7 MG/DL (ref 8.5–10.1)
CHLORIDE BLD-SCNC: 105 MMOL/L (ref 94–109)
CO2 SERPL-SCNC: 25 MMOL/L (ref 20–32)
COLOR UR AUTO: YELLOW
CREAT SERPL-MCNC: 0.66 MG/DL (ref 0.52–1.04)
EOSINOPHIL # BLD AUTO: 0.2 10E3/UL (ref 0–0.7)
EOSINOPHIL NFR BLD AUTO: 4 %
ERYTHROCYTE [DISTWIDTH] IN BLOOD BY AUTOMATED COUNT: 12.2 % (ref 10–15)
GFR SERPL CREATININE-BSD FRML MDRD: >90 ML/MIN/1.73M2
GLUCOSE BLD-MCNC: 97 MG/DL (ref 70–99)
GLUCOSE UR STRIP-MCNC: NEGATIVE MG/DL
HCG SERPL QL: NEGATIVE
HCT VFR BLD AUTO: 42.3 % (ref 35–47)
HGB BLD-MCNC: 14.4 G/DL (ref 11.7–15.7)
HGB UR QL STRIP: ABNORMAL
IMM GRANULOCYTES # BLD: 0 10E3/UL
IMM GRANULOCYTES NFR BLD: 0 %
KETONES UR STRIP-MCNC: 10 MG/DL
LEUKOCYTE ESTERASE UR QL STRIP: ABNORMAL
LIPASE SERPL-CCNC: 60 U/L (ref 73–393)
LYMPHOCYTES # BLD AUTO: 1.9 10E3/UL (ref 0.8–5.3)
LYMPHOCYTES NFR BLD AUTO: 31 %
MCH RBC QN AUTO: 30.7 PG (ref 26.5–33)
MCHC RBC AUTO-ENTMCNC: 34 G/DL (ref 31.5–36.5)
MCV RBC AUTO: 90 FL (ref 78–100)
MONOCYTES # BLD AUTO: 0.4 10E3/UL (ref 0–1.3)
MONOCYTES NFR BLD AUTO: 7 %
MUCOUS THREADS #/AREA URNS LPF: PRESENT /LPF
NEUTROPHILS # BLD AUTO: 3.4 10E3/UL (ref 1.6–8.3)
NEUTROPHILS NFR BLD AUTO: 57 %
NITRATE UR QL: NEGATIVE
NRBC # BLD AUTO: 0 10E3/UL
NRBC BLD AUTO-RTO: 0 /100
PH UR STRIP: 5.5 [PH] (ref 5–7)
PLATELET # BLD AUTO: 458 10E3/UL (ref 150–450)
POTASSIUM BLD-SCNC: 4.1 MMOL/L (ref 3.4–5.3)
PROT SERPL-MCNC: 7.5 G/DL (ref 6.8–8.8)
RBC # BLD AUTO: 4.69 10E6/UL (ref 3.8–5.2)
RBC URINE: 143 /HPF
SODIUM SERPL-SCNC: 137 MMOL/L (ref 133–144)
SP GR UR STRIP: 1.03 (ref 1–1.03)
SQUAMOUS EPITHELIAL: 22 /HPF
UROBILINOGEN UR STRIP-MCNC: NORMAL MG/DL
WBC # BLD AUTO: 6 10E3/UL (ref 4–11)
WBC URINE: 20 /HPF

## 2022-05-08 PROCEDURE — 80053 COMPREHEN METABOLIC PANEL: CPT | Performed by: EMERGENCY MEDICINE

## 2022-05-08 PROCEDURE — 87086 URINE CULTURE/COLONY COUNT: CPT | Performed by: EMERGENCY MEDICINE

## 2022-05-08 PROCEDURE — 36415 COLL VENOUS BLD VENIPUNCTURE: CPT | Performed by: EMERGENCY MEDICINE

## 2022-05-08 PROCEDURE — 99285 EMERGENCY DEPT VISIT HI MDM: CPT | Mod: 25

## 2022-05-08 PROCEDURE — 96361 HYDRATE IV INFUSION ADD-ON: CPT

## 2022-05-08 PROCEDURE — 84703 CHORIONIC GONADOTROPIN ASSAY: CPT | Performed by: EMERGENCY MEDICINE

## 2022-05-08 PROCEDURE — 250N000011 HC RX IP 250 OP 636: Performed by: EMERGENCY MEDICINE

## 2022-05-08 PROCEDURE — 81001 URINALYSIS AUTO W/SCOPE: CPT | Performed by: EMERGENCY MEDICINE

## 2022-05-08 PROCEDURE — 250N000009 HC RX 250: Performed by: EMERGENCY MEDICINE

## 2022-05-08 PROCEDURE — 74177 CT ABD & PELVIS W/CONTRAST: CPT

## 2022-05-08 PROCEDURE — 96375 TX/PRO/DX INJ NEW DRUG ADDON: CPT

## 2022-05-08 PROCEDURE — 258N000003 HC RX IP 258 OP 636: Performed by: EMERGENCY MEDICINE

## 2022-05-08 PROCEDURE — 83690 ASSAY OF LIPASE: CPT | Performed by: EMERGENCY MEDICINE

## 2022-05-08 PROCEDURE — 96374 THER/PROPH/DIAG INJ IV PUSH: CPT | Mod: 59

## 2022-05-08 PROCEDURE — 85025 COMPLETE CBC W/AUTO DIFF WBC: CPT | Performed by: EMERGENCY MEDICINE

## 2022-05-08 RX ORDER — IOPAMIDOL 755 MG/ML
60 INJECTION, SOLUTION INTRAVASCULAR ONCE
Status: COMPLETED | OUTPATIENT
Start: 2022-05-08 | End: 2022-05-08

## 2022-05-08 RX ORDER — SODIUM CHLORIDE, SODIUM LACTATE, POTASSIUM CHLORIDE, CALCIUM CHLORIDE 600; 310; 30; 20 MG/100ML; MG/100ML; MG/100ML; MG/100ML
125 INJECTION, SOLUTION INTRAVENOUS CONTINUOUS
Status: DISCONTINUED | OUTPATIENT
Start: 2022-05-08 | End: 2022-05-08 | Stop reason: HOSPADM

## 2022-05-08 RX ORDER — TAMSULOSIN HYDROCHLORIDE 0.4 MG/1
0.4 CAPSULE ORAL AT BEDTIME
Qty: 10 CAPSULE | Refills: 0 | Status: SHIPPED | OUTPATIENT
Start: 2022-05-08 | End: 2022-05-18

## 2022-05-08 RX ORDER — OXYCODONE HYDROCHLORIDE 5 MG/1
5 TABLET ORAL EVERY 6 HOURS PRN
Qty: 12 TABLET | Refills: 0 | Status: SHIPPED | OUTPATIENT
Start: 2022-05-08 | End: 2022-05-11

## 2022-05-08 RX ORDER — ONDANSETRON 4 MG/1
4 TABLET, ORALLY DISINTEGRATING ORAL EVERY 8 HOURS PRN
Qty: 10 TABLET | Refills: 0 | Status: SHIPPED | OUTPATIENT
Start: 2022-05-08 | End: 2022-05-11

## 2022-05-08 RX ORDER — ONDANSETRON 2 MG/ML
4 INJECTION INTRAMUSCULAR; INTRAVENOUS EVERY 30 MIN PRN
Status: DISCONTINUED | OUTPATIENT
Start: 2022-05-08 | End: 2022-05-08 | Stop reason: HOSPADM

## 2022-05-08 RX ADMIN — FAMOTIDINE 20 MG: 10 INJECTION, SOLUTION INTRAVENOUS at 05:27

## 2022-05-08 RX ADMIN — ONDANSETRON 4 MG: 2 INJECTION INTRAMUSCULAR; INTRAVENOUS at 05:27

## 2022-05-08 RX ADMIN — SODIUM CHLORIDE 60 ML: 900 INJECTION INTRAVENOUS at 06:07

## 2022-05-08 RX ADMIN — IOPAMIDOL 60 ML: 755 INJECTION, SOLUTION INTRAVENOUS at 06:07

## 2022-05-08 RX ADMIN — SODIUM CHLORIDE, POTASSIUM CHLORIDE, SODIUM LACTATE AND CALCIUM CHLORIDE 1000 ML: 600; 310; 30; 20 INJECTION, SOLUTION INTRAVENOUS at 05:26

## 2022-05-08 ASSESSMENT — ENCOUNTER SYMPTOMS
FREQUENCY: 0
DIARRHEA: 0
HEMATURIA: 0
CONSTIPATION: 0
FEVER: 0
ABDOMINAL PAIN: 1
DYSURIA: 0
BACK PAIN: 1

## 2022-05-08 NOTE — ED NOTES
"[facility]  ED Nurse Handoff Report    ED Chief complaint: Back Pain and Abdominal Pain      ED Diagnosis:   Final diagnoses:   None       Code Status: Full Code    Allergies:   Allergies   Allergen Reactions     Aspirin      Ibuprofen        Patient Story: Pt. Started having back pain wraps around to abdomen Wed. Hx. Of kidney stones in the past. Pt. States pain 6/10 after tylenol at 3am. Denies recent injury. Denies blood in urine. No fevers  Focused Assessment:      Treatments and/or interventions provided: see MAR  Patient's response to treatments and/or interventions:     To be done/followed up on inpatient unit:      Does this patient have any cognitive concerns?: alert and oriented    Activity level - Baseline/Home:  Independent  Activity Level - Current:   Independent    Patient's Preferred language: English   Needed?: No    Isolation: None  Infection: Not Applicable  Patient tested for COVID 19 prior to admission: YES  Bariatric?: No    Vital Signs:   Vitals:    05/08/22 0500   BP: 137/81   Pulse: 102   Resp: 20   Temp: 98.2  F (36.8  C)   TempSrc: Temporal   SpO2: 100%   Weight: 54.4 kg (120 lb)   Height: 1.626 m (5' 4\")       Cardiac Rhythm:     Was the PSS-3 completed:   Yes  What interventions are required if any?               Family Comments:   OBS brochure/video discussed/provided to patient/family: Yes              Name of person given brochure if not patient:               Relationship to patient:     For the majority of the shift this patient's behavior was Green.   Behavioral interventions performed were .    ED NURSE PHONE NUMBER: [phone]       "

## 2022-05-08 NOTE — ED TRIAGE NOTES
Pt. Started having back pain wraps around to abdomen Wed. Hx. Of kidney stones in the past. Pt. States pain 6/10 after tylenol at 3am. Denies recent injury. Denies blood in urine. No fevers.     Triage Assessment     Row Name 05/08/22 0501       Triage Assessment (Adult)    Airway WDL WDL       Respiratory WDL    Respiratory WDL WDL       Skin Circulation/Temperature WDL    Skin Circulation/Temperature WDL WDL       Cardiac WDL    Cardiac WDL WDL       Peripheral/Neurovascular WDL    Peripheral Neurovascular WDL WDL       Cognitive/Neuro/Behavioral WDL    Cognitive/Neuro/Behavioral WDL WDL

## 2022-05-08 NOTE — ED PROVIDER NOTES
"  History   Chief Complaint:  Back Pain and Abdominal Pain       HPI   Bonnie Welch is a 42 year old female who presents with back pain and abdominal pain. The patient states she first developed a light pain in the left abdomen and back 4 days ago which worsened in the last day. She states that initially the pain was intermittent but it is now constant. She has been taking Tylenol with the last dose at 0300. She denies bowel or urinary changes. She denies dysuria, hematuria or fever. She denies previous abdominal surgeries.     Review of Systems   Constitutional: Negative for fever.   Gastrointestinal: Positive for abdominal pain. Negative for constipation and diarrhea.   Genitourinary: Negative for dysuria, frequency, hematuria and urgency.   Musculoskeletal: Positive for back pain.   All other systems reviewed and are negative.        Allergies:  Aspirin  Ibuprofen    Medications:  Senna-docusate    Past Medical History:     Kidney stones    Social History:  Presents alone      Physical Exam     Patient Vitals for the past 24 hrs:   BP Temp Temp src Pulse Resp SpO2 Height Weight   05/08/22 0500 137/81 98.2  F (36.8  C) Temporal 102 20 100 % 1.626 m (5' 4\") 54.4 kg (120 lb)       Physical Exam  General: Appears well-developed and well-nourished.   Head: No signs of trauma.   CV: Normal rate and regular rhythm.    Resp: Effort normal and breath sounds normal. No respiratory distress.   GI: Soft. There is no tenderness.  No rebound or guarding.  Normal bowel sounds.  No CVA tenderness.  MSK: Normal range of motion. no edema. No Calf tenderness.  Neuro: The patient is alert and oriented. Speech normal.  Skin: Skin is warm and dry. No rash noted.   Psych: normal mood and affect. behavior is normal.       Emergency Department Course     Imaging:  CT Abdomen Pelvis w Contrast   Final Result   IMPRESSION:    1.  Mild left hydronephrosis caused by an 11 mm renal pelvis stone obstructing the ureteropelvic " junction.   2.  No other acute abnormality. No bowel obstruction or inflammation.        Report per radiology    Laboratory:  Labs Ordered and Resulted from Time of ED Arrival to Time of ED Departure   LIPASE - Abnormal       Result Value    Lipase 60 (*)    ROUTINE UA WITH MICROSCOPIC REFLEX TO CULTURE - Abnormal    Color Urine Yellow      Appearance Urine Slightly Cloudy (*)     Glucose Urine Negative      Bilirubin Urine Negative      Ketones Urine 10  (*)     Specific Gravity Urine 1.030      Blood Urine Large (*)     pH Urine 5.5      Protein Albumin Urine 100  (*)     Urobilinogen Urine Normal      Nitrite Urine Negative      Leukocyte Esterase Urine Moderate (*)     Bacteria Urine Few (*)     Mucus Urine Present (*)     RBC Urine 143 (*)     WBC Urine 20 (*)     Squamous Epithelials Urine 22 (*)    CBC WITH PLATELETS AND DIFFERENTIAL - Abnormal    WBC Count 6.0      RBC Count 4.69      Hemoglobin 14.4      Hematocrit 42.3      MCV 90      MCH 30.7      MCHC 34.0      RDW 12.2      Platelet Count 458 (*)     % Neutrophils 57      % Lymphocytes 31      % Monocytes 7      % Eosinophils 4      % Basophils 1      % Immature Granulocytes 0      NRBCs per 100 WBC 0      Absolute Neutrophils 3.4      Absolute Lymphocytes 1.9      Absolute Monocytes 0.4      Absolute Eosinophils 0.2      Absolute Basophils 0.1      Absolute Immature Granulocytes 0.0      Absolute NRBCs 0.0     COMPREHENSIVE METABOLIC PANEL - Normal    Sodium 137      Potassium 4.1      Chloride 105      Carbon Dioxide (CO2) 25      Anion Gap 7      Urea Nitrogen 9      Creatinine 0.66      Calcium 8.7      Glucose 97      Alkaline Phosphatase 62      AST 12      ALT 16      Protein Total 7.5      Albumin 4.0      Bilirubin Total 0.6      GFR Estimate >90     HCG QUALITATIVE PREGNANCY - Normal    hCG Serum Qualitative Negative     URINE CULTURE      Emergency Department Course:    Reviewed:  I reviewed nursing notes, vitals and past medical  history    Assessments:  0510 I obtained history and examined the patient as noted above.   0650 I rechecked the patient and explained findings.     Interventions:  0526: Lactated ringers 1000 mL IV Bolus    0527: Zofran 4 mg IV   0527: Pepcid 20 mg IV     Disposition:  The patient was discharged to home.     Impression & Plan     Medical Decision Making:  This is a 42-year-old woman who presents due to left back and flank pain.  She has had some pain that has waxed and waned over the last couple of days but was worse tonight.  She tried taking Tylenol with minimal relief.  Unfortunately she cannot take ibuprofen as she is allergic to it.  She does have a history of kidney stones.  UA did show red blood cells.  There is no concerning signs for infection.  CT scan did confirm an 11 mm proximal ureteral stone with mild hydronephrosis.  Patient had driven herself to the emergency department.  I did offer to give her additional pain medication if she could find a ride home, but she preferred to be able to drive herself.  She was informed of the CT finding and her pain did subside some while she was in the ER.  She was discharged with a prescription for pain medications and recommended to follow-up closely with urology as she will likely require intervention to help pass the stone.  She was instructed to return for uncontrolled pain, signs of infection, or any other concerns.    Diagnosis:    ICD-10-CM    1. Kidney stone  N20.0        Discharge Medications:  New Prescriptions    ONDANSETRON (ZOFRAN ODT) 4 MG ODT TAB    Take 1 tablet (4 mg) by mouth every 8 hours as needed for nausea    OXYCODONE (ROXICODONE) 5 MG TABLET    Take 1 tablet (5 mg) by mouth every 6 hours as needed for severe pain    TAMSULOSIN (FLOMAX) 0.4 MG CAPSULE    Take 1 capsule (0.4 mg) by mouth At Bedtime for 10 doses       Scribe Disclosure:  Renata MONROE, am serving as a scribe at 5:06 AM on 5/8/2022 to document services personally  performed by Ga Bahena MD based on my observations and the provider's statements to me.            Ga Bahena MD  05/10/22 1022

## 2022-05-09 LAB — BACTERIA UR CULT: NORMAL

## 2022-05-10 ENCOUNTER — VIRTUAL VISIT (OUTPATIENT)
Dept: UROLOGY | Facility: CLINIC | Age: 42
End: 2022-05-10
Payer: COMMERCIAL

## 2022-05-10 VITALS — HEIGHT: 64 IN | BODY MASS INDEX: 20.49 KG/M2 | WEIGHT: 120 LBS

## 2022-05-10 DIAGNOSIS — N20.0 RENAL STONE: Primary | ICD-10-CM

## 2022-05-10 DIAGNOSIS — N20.1 LEFT URETERAL STONE: Primary | ICD-10-CM

## 2022-05-10 PROCEDURE — 99204 OFFICE O/P NEW MOD 45 MIN: CPT | Mod: GT | Performed by: PHYSICIAN ASSISTANT

## 2022-05-10 ASSESSMENT — PAIN SCALES - GENERAL: PAINLEVEL: NO PAIN (0)

## 2022-05-10 NOTE — PATIENT INSTRUCTIONS
Please make an appt with Nephrology for prevention of kidney stones.   [facility]  [facility]    [address]  [place]  [address]  T:  [phone]    If you get a fever or have severe pain, please go to the ER!    We have requested time for Thurs. You will hear from our schedulers shortly!

## 2022-05-10 NOTE — LETTER
"5/10/2022       RE: Bonnie Welch  [address] [place]  [facility] [address]     Dear Colleague,    Thank you for referring your patient, Bonnie Welch, to the [facility] [place] at [facility]. Please see a copy of my visit note below.    *SEND LINK TO CELL PHONE*    TYLENOL ARE NOT HELPING    Bonnie is a 42 year old who is being evaluated via a billable video visit.      How would you like to obtain your AVS? MyChart  If the video visit is dropped, the invitation should be resent by: Text to cell phone: [phone]  Will anyone else be joining your video visit? No      Video Start Time: 2:28 PM  Video-Visit Details    Type of service:  Video Visit    Video End Time:2:50 PM    Originating Location (pt. Location): Home    Distant Location (provider location):  [facility] [place]     Platform used for Video Visit: [employer]     CC: UPJ stone.    HPI: It is a pleasure to see Ms. Bonnie Welch, a pleasant 42 year old female seen today in ER follow up for evaluation of the above. This was first detected on CT in the ED on 5/8/22 after the patient presented complaining of abdominal and back pain. The stone measures 11 mm and is located in the left UPJ with associated hydronephrosis. UA/UC in the ED was negative for infection. BMP revealed Cr and Ca to be normal. The patient was discharged with a prescription for tamsulosin and instructions to follow up with urology.    Currently, the patient reports ok today.  Last narcs yesterday.   Denies gross hematuria, dysuria, fevers, chills, N/V. Has prior history of kidney stones (16 yrs ago and 9 yrs ago; passed both on own). Saw Urology 9 yrs ago and had a \"common stone\" and needed to     RECENT IMAGING:  EXAM: CT ABDOMEN PELVIS W CONTRAST  LOCATION: [facility]  DATE/TIME: 5/8/2022 6:00 AM     INDICATION: Left-sided abdominal pain.  COMPARISON: " None.  TECHNIQUE: CT scan of the abdomen and pelvis was performed following injection of IV contrast. Multiplanar reformats were obtained. Dose reduction techniques were used.  CONTRAST: 60mL Isovue 370.     FINDINGS:   LOWER CHEST: Normal.     HEPATOBILIARY: Few small hepatic cysts.     PANCREAS: Normal.     SPLEEN: Normal.     ADRENAL GLANDS: Normal.     KIDNEYS/BLADDER: There is mild dilatation of the left renal collecting system caused by an 11 mm renal pelvis stone which is likely obstructing the ureteropelvic junction. No other urinary stones.     BOWEL: Normal.     LYMPH NODES: Normal.     VASCULATURE: Unremarkable.     PELVIC ORGANS: Normal.     MUSCULOSKELETAL: Normal.                                                                      IMPRESSION:   1.  Mild left hydronephrosis caused by an 11 mm renal pelvis stone obstructing the ureteropelvic junction.  2.  No other acute abnormality. No bowel obstruction or inflammation.  Past Medical History:   Diagnosis Date     Complication of anesthesia      Kidney stones     2008 & 2013     Spider veins        History reviewed. No pertinent surgical history.    Current Outpatient Medications   Medication Sig Dispense Refill     acetaminophen (TYLENOL) 325 MG tablet Take 2 tablets (650 mg) by mouth every 4 hours as needed for mild pain or fever (greater than or equal to 38  C /100.4  F (oral) or 38.5  C/ 101.4  F (core).)       Ascorbic Acid (VITAMIN C) 500 MG CAPS Take 1,000 mg by mouth       calcium-magnesium (CALMAG) 500-250 MG TABS per tablet Take 1 tablet by mouth daily       multivitamin w/minerals (THERA-VIT-M) tablet Take 1 tablet by mouth daily       ondansetron (ZOFRAN ODT) 4 MG ODT tab Take 1 tablet (4 mg) by mouth every 8 hours as needed for nausea 10 tablet 0     oxyCODONE (ROXICODONE) 5 MG tablet Take 1 tablet (5 mg) by mouth every 6 hours as needed for severe pain 12 tablet 0     tamsulosin (FLOMAX) 0.4 MG capsule Take 1 capsule (0.4 mg) by mouth At  "Bedtime for 10 doses 10 capsule 0     Vitamin D, Cholecalciferol, 25 MCG (1000 UT) CAPS        senna-docusate (SENOKOT-S/PERICOLACE) 8.6-50 MG tablet Take 1-2 tablets by mouth 2 times daily as needed for constipation (Patient not taking: Reported on 5/10/2022)         Allergies   Allergen Reactions     Aspirin      Ibuprofen        FAMILY HISTORY: There is no family h/o  malignancy.  There is family h/o urolithiasis (father).     Social History     Socioeconomic History     Marital status:      Spouse name: Not on file     Number of children: Not on file     Years of education: Not on file     Highest education level: Not on file   Occupational History     Not on file   Tobacco Use     Smoking status: Never Smoker     Smokeless tobacco: Never Used   Substance and Sexual Activity     Alcohol use: Not Currently     Drug use: Never     Sexual activity: Yes     Partners: Male   Other Topics Concern     Not on file   Social History Narrative     Not on file     Social Determinants of Health     Financial Resource Strain: Not on file   Food Insecurity: Not on file   Transportation Needs: Not on file   Physical Activity: Not on file   Stress: Not on file   Social Connections: Not on file   Intimate Partner Violence: Not on file   Housing Stability: Not on file         GENERAL PHYSICAL EXAM:   Ht 1.626 m (5' 4\")   Wt 54.4 kg (120 lb)   LMP 05/06/2022 (Approximate)   BMI 20.60 kg/m    PSYCH: NAD  NEURO: AAO x3      ASSESSMENT AND PLAN: 42 year old female with a large left-sided calculus with associated  hydronephrosis.   - Continue tamsulosin 0.4 mg daily. Refills can provided if needed.  - Continue to push fluids.  - Has oxy for pain.  - Warning signs discussed including fevers, chills, N/V, gross hematuria, severe pain that is refractory to medications which should prompt more urgent evaluation. Patient understands to proceed to the ER should these warning signs occur outside of clinic hours.    During " counseling for this visit, we covered the natural history of kidney stones, the risk of progression to symptomatic pain/infection, and the possibility of renal failure/kidney damage.  We covered the treatment option of ureteroscopy/laser/stent. We discussed there is a chance of needing a stent up front followed by a second procedure for definitive stone removal.       Neph referral for medical management and prevention.       Lianet Gregory PA-C  [facility]      32 minutes spent on the date of the encounter doing chart review, review of outside records, review of test results, interpretation of tests, patient visit and documentation and review of CT images.

## 2022-05-10 NOTE — PROGRESS NOTES
"*SEND LINK TO CELL PHONE*    TYLENOL ARE NOT HELPING    oBnnie is a 42 year old who is being evaluated via a billable video visit.      How would you like to obtain your AVS? MyChart  If the video visit is dropped, the invitation should be resent by: Text to cell phone: [phone]  Will anyone else be joining your video visit? No      Video Start Time: 2:28 PM  Video-Visit Details    Type of service:  Video Visit    Video End Time:2:50 PM    Originating Location (pt. Location): Home    Distant Location (provider location):  [facility] [place]     Platform used for Video Visit: [employer]     CC: UPJ stone.    HPI: It is a pleasure to see Ms. Bonnie Welch, a pleasant 42 year old female seen today in ER follow up for evaluation of the above. This was first detected on CT in the ED on 5/8/22 after the patient presented complaining of abdominal and back pain. The stone measures 11 mm and is located in the left UPJ with associated hydronephrosis. UA/UC in the ED was negative for infection. BMP revealed Cr and Ca to be normal. The patient was discharged with a prescription for tamsulosin and instructions to follow up with urology.    Currently, the patient reports ok today.  Last narcs yesterday.   Denies gross hematuria, dysuria, fevers, chills, N/V. Has prior history of kidney stones (16 yrs ago and 9 yrs ago; passed both on own). Saw Urology 9 yrs ago and had a \"common stone\" and needed to     RECENT IMAGING:  EXAM: CT ABDOMEN PELVIS W CONTRAST  LOCATION: [facility]  DATE/TIME: 5/8/2022 6:00 AM     INDICATION: Left-sided abdominal pain.  COMPARISON: None.  TECHNIQUE: CT scan of the abdomen and pelvis was performed following injection of IV contrast. Multiplanar reformats were obtained. Dose reduction techniques were used.  CONTRAST: 60mL Isovue 370.     FINDINGS:   LOWER CHEST: Normal.     HEPATOBILIARY: Few small hepatic cysts.     PANCREAS: Normal.     SPLEEN: " Normal.     ADRENAL GLANDS: Normal.     KIDNEYS/BLADDER: There is mild dilatation of the left renal collecting system caused by an 11 mm renal pelvis stone which is likely obstructing the ureteropelvic junction. No other urinary stones.     BOWEL: Normal.     LYMPH NODES: Normal.     VASCULATURE: Unremarkable.     PELVIC ORGANS: Normal.     MUSCULOSKELETAL: Normal.                                                                      IMPRESSION:   1.  Mild left hydronephrosis caused by an 11 mm renal pelvis stone obstructing the ureteropelvic junction.  2.  No other acute abnormality. No bowel obstruction or inflammation.  Past Medical History:   Diagnosis Date     Complication of anesthesia      Kidney stones     2008 & 2013     Spider veins        History reviewed. No pertinent surgical history.    Current Outpatient Medications   Medication Sig Dispense Refill     acetaminophen (TYLENOL) 325 MG tablet Take 2 tablets (650 mg) by mouth every 4 hours as needed for mild pain or fever (greater than or equal to 38  C /100.4  F (oral) or 38.5  C/ 101.4  F (core).)       Ascorbic Acid (VITAMIN C) 500 MG CAPS Take 1,000 mg by mouth       calcium-magnesium (CALMAG) 500-250 MG TABS per tablet Take 1 tablet by mouth daily       multivitamin w/minerals (THERA-VIT-M) tablet Take 1 tablet by mouth daily       ondansetron (ZOFRAN ODT) 4 MG ODT tab Take 1 tablet (4 mg) by mouth every 8 hours as needed for nausea 10 tablet 0     oxyCODONE (ROXICODONE) 5 MG tablet Take 1 tablet (5 mg) by mouth every 6 hours as needed for severe pain 12 tablet 0     tamsulosin (FLOMAX) 0.4 MG capsule Take 1 capsule (0.4 mg) by mouth At Bedtime for 10 doses 10 capsule 0     Vitamin D, Cholecalciferol, 25 MCG (1000 UT) CAPS        senna-docusate (SENOKOT-S/PERICOLACE) 8.6-50 MG tablet Take 1-2 tablets by mouth 2 times daily as needed for constipation (Patient not taking: Reported on 5/10/2022)         Allergies   Allergen Reactions     Aspirin       "Ibuprofen        FAMILY HISTORY: There is no family h/o  malignancy.  There is family h/o urolithiasis (father).     Social History     Socioeconomic History     Marital status:      Spouse name: Not on file     Number of children: Not on file     Years of education: Not on file     Highest education level: Not on file   Occupational History     Not on file   Tobacco Use     Smoking status: Never Smoker     Smokeless tobacco: Never Used   Substance and Sexual Activity     Alcohol use: Not Currently     Drug use: Never     Sexual activity: Yes     Partners: Male   Other Topics Concern     Not on file   Social History Narrative     Not on file     Social Determinants of Health     Financial Resource Strain: Not on file   Food Insecurity: Not on file   Transportation Needs: Not on file   Physical Activity: Not on file   Stress: Not on file   Social Connections: Not on file   Intimate Partner Violence: Not on file   Housing Stability: Not on file         GENERAL PHYSICAL EXAM:   Ht 1.626 m (5' 4\")   Wt 54.4 kg (120 lb)   LMP 05/06/2022 (Approximate)   BMI 20.60 kg/m    PSYCH: NAD  NEURO: AAO x3      ASSESSMENT AND PLAN: 42 year old female with a large left-sided calculus with associated  hydronephrosis.   - Continue tamsulosin 0.4 mg daily. Refills can provided if needed.  - Continue to push fluids.  - Has oxy for pain.  - Warning signs discussed including fevers, chills, N/V, gross hematuria, severe pain that is refractory to medications which should prompt more urgent evaluation. Patient understands to proceed to the ER should these warning signs occur outside of clinic hours.    During counseling for this visit, we covered the natural history of kidney stones, the risk of progression to symptomatic pain/infection, and the possibility of renal failure/kidney damage.  We covered the treatment option of ureteroscopy/laser/stent. We discussed there is a chance of needing a stent up front followed by a second " procedure for definitive stone removal.       Neph referral for medical management and prevention.       Lianet Gregory PA-C  [facility]      32 minutes spent on the date of the encounter doing chart review, review of outside records, review of test results, interpretation of tests, patient visit and documentation and review of CT images.

## 2022-05-10 NOTE — H&P (VIEW-ONLY)
"*SEND LINK TO CELL PHONE*    TYLENOL ARE NOT HELPING    Bonnie is a 42 year old who is being evaluated via a billable video visit.      How would you like to obtain your AVS? MyChart  If the video visit is dropped, the invitation should be resent by: Text to cell phone: [phone]  Will anyone else be joining your video visit? No      Video Start Time: 2:28 PM  Video-Visit Details    Type of service:  Video Visit    Video End Time:2:50 PM    Originating Location (pt. Location): Home    Distant Location (provider location):  [facility] [employer]     Platform used for Video Visit: [employer]     CC: UPJ stone.    HPI: It is a pleasure to see Ms. Bonnie Welch, a pleasant 42 year old female seen today in ER follow up for evaluation of the above. This was first detected on CT in the ED on 5/8/22 after the patient presented complaining of abdominal and back pain. The stone measures 11 mm and is located in the left UPJ with associated hydronephrosis. UA/UC in the ED was negative for infection. BMP revealed Cr and Ca to be normal. The patient was discharged with a prescription for tamsulosin and instructions to follow up with urology.    Currently, the patient reports ok today.  Last narcs yesterday.   Denies gross hematuria, dysuria, fevers, chills, N/V. Has prior history of kidney stones (16 yrs ago and 9 yrs ago; passed both on own). Saw Urology 9 yrs ago and had a \"common stone\" and needed to     RECENT IMAGING:  EXAM: CT ABDOMEN PELVIS W CONTRAST  LOCATION: [facility]  DATE/TIME: 5/8/2022 6:00 AM     INDICATION: Left-sided abdominal pain.  COMPARISON: None.  TECHNIQUE: CT scan of the abdomen and pelvis was performed following injection of IV contrast. Multiplanar reformats were obtained. Dose reduction techniques were used.  CONTRAST: 60mL Isovue 370.     FINDINGS:   LOWER CHEST: Normal.     HEPATOBILIARY: Few small hepatic cysts.     PANCREAS: Normal.     SPLEEN: " Normal.     ADRENAL GLANDS: Normal.     KIDNEYS/BLADDER: There is mild dilatation of the left renal collecting system caused by an 11 mm renal pelvis stone which is likely obstructing the ureteropelvic junction. No other urinary stones.     BOWEL: Normal.     LYMPH NODES: Normal.     VASCULATURE: Unremarkable.     PELVIC ORGANS: Normal.     MUSCULOSKELETAL: Normal.                                                                      IMPRESSION:   1.  Mild left hydronephrosis caused by an 11 mm renal pelvis stone obstructing the ureteropelvic junction.  2.  No other acute abnormality. No bowel obstruction or inflammation.  Past Medical History:   Diagnosis Date     Complication of anesthesia      Kidney stones     2008 & 2013     Spider veins        History reviewed. No pertinent surgical history.    Current Outpatient Medications   Medication Sig Dispense Refill     acetaminophen (TYLENOL) 325 MG tablet Take 2 tablets (650 mg) by mouth every 4 hours as needed for mild pain or fever (greater than or equal to 38  C /100.4  F (oral) or 38.5  C/ 101.4  F (core).)       Ascorbic Acid (VITAMIN C) 500 MG CAPS Take 1,000 mg by mouth       calcium-magnesium (CALMAG) 500-250 MG TABS per tablet Take 1 tablet by mouth daily       multivitamin w/minerals (THERA-VIT-M) tablet Take 1 tablet by mouth daily       ondansetron (ZOFRAN ODT) 4 MG ODT tab Take 1 tablet (4 mg) by mouth every 8 hours as needed for nausea 10 tablet 0     oxyCODONE (ROXICODONE) 5 MG tablet Take 1 tablet (5 mg) by mouth every 6 hours as needed for severe pain 12 tablet 0     tamsulosin (FLOMAX) 0.4 MG capsule Take 1 capsule (0.4 mg) by mouth At Bedtime for 10 doses 10 capsule 0     Vitamin D, Cholecalciferol, 25 MCG (1000 UT) CAPS        senna-docusate (SENOKOT-S/PERICOLACE) 8.6-50 MG tablet Take 1-2 tablets by mouth 2 times daily as needed for constipation (Patient not taking: Reported on 5/10/2022)         Allergies   Allergen Reactions     Aspirin       "Ibuprofen        FAMILY HISTORY: There is no family h/o  malignancy.  There is family h/o urolithiasis (father).     Social History     Socioeconomic History     Marital status:      Spouse name: Not on file     Number of children: Not on file     Years of education: Not on file     Highest education level: Not on file   Occupational History     Not on file   Tobacco Use     Smoking status: Never Smoker     Smokeless tobacco: Never Used   Substance and Sexual Activity     Alcohol use: Not Currently     Drug use: Never     Sexual activity: Yes     Partners: Male   Other Topics Concern     Not on file   Social History Narrative     Not on file     Social Determinants of Health     Financial Resource Strain: Not on file   Food Insecurity: Not on file   Transportation Needs: Not on file   Physical Activity: Not on file   Stress: Not on file   Social Connections: Not on file   Intimate Partner Violence: Not on file   Housing Stability: Not on file         GENERAL PHYSICAL EXAM:   Ht 1.626 m (5' 4\")   Wt 54.4 kg (120 lb)   LMP 05/06/2022 (Approximate)   BMI 20.60 kg/m    PSYCH: NAD  NEURO: AAO x3      ASSESSMENT AND PLAN: 42 year old female with a large left-sided calculus with associated  hydronephrosis.   - Continue tamsulosin 0.4 mg daily. Refills can provided if needed.  - Continue to push fluids.  - Has oxy for pain.  - Warning signs discussed including fevers, chills, N/V, gross hematuria, severe pain that is refractory to medications which should prompt more urgent evaluation. Patient understands to proceed to the ER should these warning signs occur outside of clinic hours.    During counseling for this visit, we covered the natural history of kidney stones, the risk of progression to symptomatic pain/infection, and the possibility of renal failure/kidney damage.  We covered the treatment option of ureteroscopy/laser/stent. We discussed there is a chance of needing a stent up front followed by a second " procedure for definitive stone removal.       Neph referral for medical management and prevention.       Lianet Gregory PA-C  [facility]      32 minutes spent on the date of the encounter doing chart review, review of outside records, review of test results, interpretation of tests, patient visit and documentation and review of CT images.

## 2022-05-11 ENCOUNTER — LAB (OUTPATIENT)
Dept: URGENT CARE | Facility: URGENT CARE | Age: 42
End: 2022-05-11
Attending: PHYSICIAN ASSISTANT
Payer: COMMERCIAL

## 2022-05-11 DIAGNOSIS — N20.0 RENAL STONE: ICD-10-CM

## 2022-05-11 LAB — SARS-COV-2 RNA RESP QL NAA+PROBE: NEGATIVE

## 2022-05-11 PROCEDURE — U0005 INFEC AGEN DETEC AMPLI PROBE: HCPCS

## 2022-05-11 PROCEDURE — U0003 INFECTIOUS AGENT DETECTION BY NUCLEIC ACID (DNA OR RNA); SEVERE ACUTE RESPIRATORY SYNDROME CORONAVIRUS 2 (SARS-COV-2) (CORONAVIRUS DISEASE [COVID-19]), AMPLIFIED PROBE TECHNIQUE, MAKING USE OF HIGH THROUGHPUT TECHNOLOGIES AS DESCRIBED BY CMS-2020-01-R: HCPCS

## 2022-05-12 ENCOUNTER — HOSPITAL ENCOUNTER (OUTPATIENT)
Facility: CLINIC | Age: 42
Discharge: HOME | End: 2022-05-12
Attending: UROLOGY | Admitting: UROLOGY
Payer: COMMERCIAL

## 2022-05-12 ENCOUNTER — APPOINTMENT (OUTPATIENT)
Dept: GENERAL RADIOLOGY | Facility: CLINIC | Age: 42
End: 2022-05-12
Attending: UROLOGY
Payer: COMMERCIAL

## 2022-05-12 ENCOUNTER — ANESTHESIA (OUTPATIENT)
Dept: SURGERY | Facility: CLINIC | Age: 42
End: 2022-05-12
Payer: COMMERCIAL

## 2022-05-12 ENCOUNTER — ANESTHESIA EVENT (OUTPATIENT)
Dept: SURGERY | Facility: CLINIC | Age: 42
End: 2022-05-12
Payer: COMMERCIAL

## 2022-05-12 VITALS
HEIGHT: 64 IN | RESPIRATION RATE: 16 BRPM | DIASTOLIC BLOOD PRESSURE: 74 MMHG | HEART RATE: 86 BPM | SYSTOLIC BLOOD PRESSURE: 129 MMHG | TEMPERATURE: 99.2 F | OXYGEN SATURATION: 97 % | WEIGHT: 119.8 LBS | BODY MASS INDEX: 20.45 KG/M2

## 2022-05-12 LAB — HCG UR QL: NEGATIVE

## 2022-05-12 PROCEDURE — 74420 UROGRAPHY RTRGR +-KUB: CPT | Mod: 26 | Performed by: UROLOGY

## 2022-05-12 PROCEDURE — C1894 INTRO/SHEATH, NON-LASER: HCPCS | Performed by: UROLOGY

## 2022-05-12 PROCEDURE — 370N000017 HC ANESTHESIA TECHNICAL FEE, PER MIN: Performed by: UROLOGY

## 2022-05-12 PROCEDURE — 250N000011 HC RX IP 250 OP 636: Performed by: ANESTHESIOLOGY

## 2022-05-12 PROCEDURE — 250N000025 HC SEVOFLURANE, PER MIN: Performed by: UROLOGY

## 2022-05-12 PROCEDURE — 999N000141 HC STATISTIC PRE-PROCEDURE NURSING ASSESSMENT: Performed by: UROLOGY

## 2022-05-12 PROCEDURE — 81025 URINE PREGNANCY TEST: CPT | Performed by: ANESTHESIOLOGY

## 2022-05-12 PROCEDURE — 710N000012 HC RECOVERY PHASE 2, PER MINUTE: Performed by: UROLOGY

## 2022-05-12 PROCEDURE — 250N000011 HC RX IP 250 OP 636: Performed by: NURSE ANESTHETIST, CERTIFIED REGISTERED

## 2022-05-12 PROCEDURE — 360N000076 HC SURGERY LEVEL 3, PER MIN: Performed by: UROLOGY

## 2022-05-12 PROCEDURE — 250N000009 HC RX 250: Performed by: UROLOGY

## 2022-05-12 PROCEDURE — 250N000013 HC RX MED GY IP 250 OP 250 PS 637: Performed by: ANESTHESIOLOGY

## 2022-05-12 PROCEDURE — 52356 CYSTO/URETERO W/LITHOTRIPSY: CPT | Mod: LT | Performed by: UROLOGY

## 2022-05-12 PROCEDURE — C2617 STENT, NON-COR, TEM W/O DEL: HCPCS | Performed by: UROLOGY

## 2022-05-12 PROCEDURE — 999N000179 XR SURGERY CARM FLUORO LESS THAN 5 MIN W STILLS: Mod: TC

## 2022-05-12 PROCEDURE — 82365 CALCULUS SPECTROSCOPY: CPT | Performed by: UROLOGY

## 2022-05-12 PROCEDURE — 250N000009 HC RX 250: Performed by: NURSE ANESTHETIST, CERTIFIED REGISTERED

## 2022-05-12 PROCEDURE — 258N000003 HC RX IP 258 OP 636: Performed by: NURSE ANESTHETIST, CERTIFIED REGISTERED

## 2022-05-12 PROCEDURE — 710N000009 HC RECOVERY PHASE 1, LEVEL 1, PER MIN: Performed by: UROLOGY

## 2022-05-12 PROCEDURE — C1769 GUIDE WIRE: HCPCS | Performed by: UROLOGY

## 2022-05-12 PROCEDURE — 250N000011 HC RX IP 250 OP 636: Performed by: UROLOGY

## 2022-05-12 PROCEDURE — 272N000001 HC OR GENERAL SUPPLY STERILE: Performed by: UROLOGY

## 2022-05-12 DEVICE — URETERAL STENT
Type: IMPLANTABLE DEVICE | Site: URETER | Status: FUNCTIONAL
Brand: POLARIS™ ULTRA

## 2022-05-12 RX ORDER — LIDOCAINE HYDROCHLORIDE 20 MG/ML
INJECTION, SOLUTION INFILTRATION; PERINEURAL PRN
Status: DISCONTINUED | OUTPATIENT
Start: 2022-05-12 | End: 2022-05-12

## 2022-05-12 RX ORDER — PROPOFOL 10 MG/ML
INJECTION, EMULSION INTRAVENOUS PRN
Status: DISCONTINUED | OUTPATIENT
Start: 2022-05-12 | End: 2022-05-12

## 2022-05-12 RX ORDER — FENTANYL CITRATE 0.05 MG/ML
25 INJECTION, SOLUTION INTRAMUSCULAR; INTRAVENOUS EVERY 5 MIN PRN
Status: DISCONTINUED | OUTPATIENT
Start: 2022-05-12 | End: 2022-05-12 | Stop reason: HOSPADM

## 2022-05-12 RX ORDER — PROPOFOL 10 MG/ML
INJECTION, EMULSION INTRAVENOUS CONTINUOUS PRN
Status: DISCONTINUED | OUTPATIENT
Start: 2022-05-12 | End: 2022-05-12

## 2022-05-12 RX ORDER — SODIUM CHLORIDE, SODIUM LACTATE, POTASSIUM CHLORIDE, CALCIUM CHLORIDE 600; 310; 30; 20 MG/100ML; MG/100ML; MG/100ML; MG/100ML
INJECTION, SOLUTION INTRAVENOUS CONTINUOUS PRN
Status: DISCONTINUED | OUTPATIENT
Start: 2022-05-12 | End: 2022-05-12

## 2022-05-12 RX ORDER — FENTANYL CITRATE 0.05 MG/ML
25 INJECTION, SOLUTION INTRAMUSCULAR; INTRAVENOUS
Status: CANCELLED | OUTPATIENT
Start: 2022-05-12

## 2022-05-12 RX ORDER — ATROPA BELLADONNA AND OPIUM 16.2; 3 MG/1; MG/1
SUPPOSITORY RECTAL PRN
Status: DISCONTINUED | OUTPATIENT
Start: 2022-05-12 | End: 2022-05-12 | Stop reason: HOSPADM

## 2022-05-12 RX ORDER — CEFAZOLIN SODIUM/WATER 2 G/20 ML
2 SYRINGE (ML) INTRAVENOUS
Status: DISCONTINUED | OUTPATIENT
Start: 2022-05-12 | End: 2022-05-12 | Stop reason: HOSPADM

## 2022-05-12 RX ORDER — CEFAZOLIN SODIUM/WATER 2 G/20 ML
2 SYRINGE (ML) INTRAVENOUS SEE ADMIN INSTRUCTIONS
Status: DISCONTINUED | OUTPATIENT
Start: 2022-05-12 | End: 2022-05-12 | Stop reason: HOSPADM

## 2022-05-12 RX ORDER — ONDANSETRON 2 MG/ML
4 INJECTION INTRAMUSCULAR; INTRAVENOUS EVERY 30 MIN PRN
Status: DISCONTINUED | OUTPATIENT
Start: 2022-05-12 | End: 2022-05-12 | Stop reason: HOSPADM

## 2022-05-12 RX ORDER — OXYCODONE HYDROCHLORIDE 5 MG/1
5 TABLET ORAL EVERY 4 HOURS PRN
Status: DISCONTINUED | OUTPATIENT
Start: 2022-05-12 | End: 2022-05-12 | Stop reason: HOSPADM

## 2022-05-12 RX ORDER — ONDANSETRON 4 MG/1
4 TABLET, ORALLY DISINTEGRATING ORAL EVERY 30 MIN PRN
Status: DISCONTINUED | OUTPATIENT
Start: 2022-05-12 | End: 2022-05-12 | Stop reason: HOSPADM

## 2022-05-12 RX ORDER — SODIUM CHLORIDE, SODIUM LACTATE, POTASSIUM CHLORIDE, CALCIUM CHLORIDE 600; 310; 30; 20 MG/100ML; MG/100ML; MG/100ML; MG/100ML
INJECTION, SOLUTION INTRAVENOUS CONTINUOUS
Status: DISCONTINUED | OUTPATIENT
Start: 2022-05-12 | End: 2022-05-12 | Stop reason: HOSPADM

## 2022-05-12 RX ORDER — FENTANYL CITRATE 50 UG/ML
INJECTION, SOLUTION INTRAMUSCULAR; INTRAVENOUS PRN
Status: DISCONTINUED | OUTPATIENT
Start: 2022-05-12 | End: 2022-05-12

## 2022-05-12 RX ORDER — DEXAMETHASONE SODIUM PHOSPHATE 4 MG/ML
INJECTION, SOLUTION INTRA-ARTICULAR; INTRALESIONAL; INTRAMUSCULAR; INTRAVENOUS; SOFT TISSUE PRN
Status: DISCONTINUED | OUTPATIENT
Start: 2022-05-12 | End: 2022-05-12

## 2022-05-12 RX ORDER — HYDROMORPHONE HCL IN WATER/PF 6 MG/30 ML
0.2 PATIENT CONTROLLED ANALGESIA SYRINGE INTRAVENOUS EVERY 5 MIN PRN
Status: DISCONTINUED | OUTPATIENT
Start: 2022-05-12 | End: 2022-05-12 | Stop reason: HOSPADM

## 2022-05-12 RX ORDER — MEPERIDINE HYDROCHLORIDE 25 MG/ML
12.5 INJECTION INTRAMUSCULAR; INTRAVENOUS; SUBCUTANEOUS
Status: DISCONTINUED | OUTPATIENT
Start: 2022-05-12 | End: 2022-05-12 | Stop reason: HOSPADM

## 2022-05-12 RX ADMIN — FENTANYL CITRATE 25 MCG: 50 INJECTION, SOLUTION INTRAMUSCULAR; INTRAVENOUS at 12:31

## 2022-05-12 RX ADMIN — FENTANYL CITRATE 25 MCG: 50 INJECTION, SOLUTION INTRAMUSCULAR; INTRAVENOUS at 13:44

## 2022-05-12 RX ADMIN — DEXAMETHASONE SODIUM PHOSPHATE 4 MG: 4 INJECTION, SOLUTION INTRA-ARTICULAR; INTRALESIONAL; INTRAMUSCULAR; INTRAVENOUS; SOFT TISSUE at 11:14

## 2022-05-12 RX ADMIN — SODIUM CHLORIDE, POTASSIUM CHLORIDE, SODIUM LACTATE AND CALCIUM CHLORIDE: 600; 310; 30; 20 INJECTION, SOLUTION INTRAVENOUS at 10:52

## 2022-05-12 RX ADMIN — Medication 2 G: at 10:46

## 2022-05-12 RX ADMIN — FENTANYL CITRATE 25 MCG: 50 INJECTION, SOLUTION INTRAMUSCULAR; INTRAVENOUS at 12:16

## 2022-05-12 RX ADMIN — HYDROMORPHONE HYDROCHLORIDE 0.2 MG: 0.2 INJECTION, SOLUTION INTRAMUSCULAR; INTRAVENOUS; SUBCUTANEOUS at 12:44

## 2022-05-12 RX ADMIN — LIDOCAINE HYDROCHLORIDE 60 MG: 20 INJECTION, SOLUTION INFILTRATION; PERINEURAL at 10:57

## 2022-05-12 RX ADMIN — MIDAZOLAM 2 MG: 1 INJECTION INTRAMUSCULAR; INTRAVENOUS at 10:52

## 2022-05-12 RX ADMIN — FENTANYL CITRATE 50 MCG: 50 INJECTION, SOLUTION INTRAMUSCULAR; INTRAVENOUS at 10:57

## 2022-05-12 RX ADMIN — OXYCODONE HYDROCHLORIDE 5 MG: 5 TABLET ORAL at 13:15

## 2022-05-12 RX ADMIN — PROPOFOL 30 MCG/KG/MIN: 10 INJECTION, EMULSION INTRAVENOUS at 11:01

## 2022-05-12 RX ADMIN — PROPOFOL 200 MG: 10 INJECTION, EMULSION INTRAVENOUS at 10:57

## 2022-05-12 RX ADMIN — FENTANYL CITRATE 50 MCG: 50 INJECTION, SOLUTION INTRAMUSCULAR; INTRAVENOUS at 11:19

## 2022-05-12 RX ADMIN — HYDROMORPHONE HYDROCHLORIDE 0.2 MG: 0.2 INJECTION, SOLUTION INTRAMUSCULAR; INTRAVENOUS; SUBCUTANEOUS at 12:57

## 2022-05-12 ASSESSMENT — ENCOUNTER SYMPTOMS: SEIZURES: 0

## 2022-05-12 ASSESSMENT — LIFESTYLE VARIABLES: TOBACCO_USE: 0

## 2022-05-12 NOTE — ANESTHESIA POSTPROCEDURE EVALUATION
Patient: Bonnie Welch    Procedure: Procedure(s):  CYSTOSCOPY, LEFT URETEROSCPY, LEFT RETROGRADES, WITH HOLMIUM LASER LITHOTRIPSY,  LEFT URETERAL STENT INSERTION  Cystoscopy, retrogrades, combined       Anesthesia Type:  General    Note:     Postop Pain Control: Uneventful            Sign Out: Well controlled pain   PONV: No   Neuro/Psych: Uneventful            Sign Out: Acceptable/Baseline neuro status   Airway/Respiratory: Uneventful            Sign Out: Acceptable/Baseline resp. status   CV/Hemodynamics: Uneventful            Sign Out: Acceptable CV status; No obvious hypovolemia; No obvious fluid overload   Other NRE: NONE   DID A NON-ROUTINE EVENT OCCUR? No           Last vitals:  Vitals Value Taken Time   /79 05/12/22 1345   Temp 37.2  C (99  F) 05/12/22 1345   Pulse 89 05/12/22 1345   Resp 15 05/12/22 1345   SpO2 100 % 05/12/22 1400   Vitals shown include unvalidated device data.    Electronically Signed By: Chad Sutton MD  May 12, 2022  2:01 PM

## 2022-05-12 NOTE — ANESTHESIA PREPROCEDURE EVALUATION
Anesthesia Pre-Procedure Evaluation    Patient: Bonnie Welch   MRN: [id number] : 1980        Procedure : Procedure(s):  CYSTOSCOPY, LEFT URETEROSCPY, WITH LITHOTRIPSY USING LASER AND LEFT URETERAL STENT INSERTION          Past Medical History:   Diagnosis Date     Complication of anesthesia      Kidney stones      & 2013     Spider veins       No past surgical history on file.   Allergies   Allergen Reactions     Aspirin      Ibuprofen       Social History     Tobacco Use     Smoking status: Never Smoker     Smokeless tobacco: Never Used   Substance Use Topics     Alcohol use: Not Currently      Wt Readings from Last 1 Encounters:   22 54.3 kg (119 lb 12.8 oz)        Anesthesia Evaluation   Pt has had prior anesthetic.     History of anesthetic complications       ROS/MED HX  ENT/Pulmonary:    (-) tobacco use and sleep apnea   Neurologic:    (-) no seizures and no CVA   Cardiovascular:    (-) hypertension   METS/Exercise Tolerance:     Hematologic:       Musculoskeletal:       GI/Hepatic:    (-) GERD and liver disease   Renal/Genitourinary:     (+) Nephrolithiasis ,  (-) renal disease   Endo:    (-) Type II DM and thyroid disease   Psychiatric/Substance Use:       Infectious Disease:       Malignancy:       Other:            Physical Exam    Airway        Mallampati: II   TM distance: > 3 FB   Neck ROM: full   Mouth opening: > 3 cm    Respiratory Devices and Support         Dental  no notable dental history         Cardiovascular   cardiovascular exam normal          Pulmonary   pulmonary exam normal                OUTSIDE LABS:  CBC:   Lab Results   Component Value Date    WBC 6.0 2022    WBC 11.9 (H) 2019    HGB 14.4 2022    HGB 11.4 (L) 2019    HCT 42.3 2022    HCT 33.5 (L) 2019     (H) 2022     2019     BMP:   Lab Results   Component Value Date     2022    POTASSIUM 4.1 2022    CHLORIDE 105 2022    CO2  25 05/08/2022    BUN 9 05/08/2022    CR 0.66 05/08/2022    GLC 97 05/08/2022     COAGS: No results found for: PTT, INR, FIBR  POC:   Lab Results   Component Value Date    HCGS Negative 05/08/2022     HEPATIC:   Lab Results   Component Value Date    ALBUMIN 4.0 05/08/2022    PROTTOTAL 7.5 05/08/2022    ALT 16 05/08/2022    AST 12 05/08/2022    ALKPHOS 62 05/08/2022    BILITOTAL 0.6 05/08/2022     OTHER:   Lab Results   Component Value Date    CARLY 8.7 05/08/2022    LIPASE 60 (L) 05/08/2022       Anesthesia Plan    ASA Status:  2   NPO Status:  NPO Appropriate    Anesthesia Type: General.     - Airway: LMA   Induction: Intravenous.   Maintenance: Balanced.        Consents    Anesthesia Plan(s) and associated risks, benefits, and realistic alternatives discussed. Questions answered and patient/representative(s) expressed understanding.    - Discussed:     - Discussed with:  Patient         Postoperative Care    Pain management: Multi-modal analgesia.   PONV prophylaxis: Ondansetron (or other 5HT-3), Dexamethasone or Solumedrol, Background Propofol Infusion     Comments:                Chad Sutton MD

## 2022-05-12 NOTE — ANESTHESIA PROCEDURE NOTES
Airway       Patient location during procedure: OR  Staff -        Anesthesiologist:  Chad Sutton MD       CRNA: Sasha Jordan APRN CRNA       Performed By: SRNAIndications and Patient Condition       Indications for airway management: evelio-procedural       Induction type:intravenous       Mask difficulty assessment: 0 - not attempted    Final Airway Details       Final airway type: supraglottic airway    Supraglottic Airway Details        Type: LMA       Brand: I-Gel       LMA size: 4    Post intubation assessment        Placement verified by: capnometry, equal breath sounds and chest rise        Number of attempts at approach: 1       Number of other approaches attempted: 0       Secured with: silk tape       Ease of procedure: easy       Dentition: Intact and Unchanged

## 2022-05-12 NOTE — DISCHARGE INSTRUCTIONS
Same Day Surgery Discharge Instructions for  Sedation and General Anesthesia     It's not unusual to feel dizzy, light-headed or faint for up to 24 hours after surgery or while taking pain medication.  If you have these symptoms: sit for a few minutes before standing and have someone assist you when you get up to walk or use the bathroom.    You should rest and relax for the next 24 hours. We recommend you make arrangements to have an adult stay with you for at least 24 hours after your discharge.  Avoid hazardous and strenuous activity.    DO NOT DRIVE any vehicle or operate mechanical equipment for 24 hours following the end of your surgery.  Even though you may feel normal, your reactions may be affected by the medication you have received.    Do not drink alcoholic beverages for 24 hours following surgery.     Slowly progress to your regular diet as you feel able. It's not unusual to feel nauseated and/or vomit after receiving anesthesia.  If you develop these symptoms, drink clear liquids (apple juice, ginger ale, broth, 7-up, etc. ) until you feel better.  If your nausea and vomiting persists for 24 hours, please notify your surgeon.      All narcotic pain medications, along with inactivity and anesthesia, can cause constipation. Drinking plenty of liquids and increasing fiber intake will help.    For any questions of a medical nature, call your surgeon.    Do not make important decisions for 24 hours.    If you had general anesthesia, you may have a sore throat for a couple of days related to the breathing tube used during surgery.  You may use Cepacol lozenges to help with this discomfort.  If it worsens or if you develop a fever, contact your surgeon.     If you feel your pain is not well managed with the pain medications prescribed by your surgeon, please contact your surgeon's office to let them know so they can address your concerns.       CoVid 19 Information    We want to give you information regarding  Covid. Please consult your primary care provider with any questions you might have.     Patient who have symptoms (cough, fever, or shortness of breath), need to isolate for 7 days from when symptoms started OR 72 hours after fever resolves (without fever reducing medications) AND improvement of respiratory symptoms (whichever is longer).    Isolate yourself at home (in own room/own bathroom if possible)  Do Not allow any visitors  Do Not go to work or school  Do Not go to [employer],  centers, shopping, or other public places.  Do Not shake hands.  Avoid close and intimate contact with others (hugging, kissing).  Follow CDC recommendations for household cleaning of frequently touched services.     After the initial 7 days, continue to isolate yourself from household members as much as possible. To continue decrease the risk of community spread and exposure, you and any members of your household should limit activities in public for 14 days after starting home isolation.     You can reference the following CDC link for helpful home isolation/care tips:  https://www.cdc.gov/coronavirus/2019-ncov/downloads/10Things.pdf    Protect Others:  Cover Your Mouth and Nose with a mask, disposable tissue or wash cloth to avoid spreading germs to others.  Wash your hands and face frequently with soap and water    Call Your Primary Doctor If: Breathing difficulty develops or you become worse.    For more information about COVID19 and options for caring for yourself at home, please visit the CDC website at https://www.cdc.gov/coronavirus/2019-ncov/about/steps-when-sick.html  For more options for care at [facility], please visit our website at [email or web address]    STENT INFORMATION SHEET  UROLOGIC PHYSICIANS, BALJINDER Loera M.D., IRENE Rice M.D.,   ANDI Otero M.D., CAROL Wise M.D.    [phone]    During surgery, a stent may be place in the ureter.  The ureter is the tube that  drains urine from the kidney to the bladder.  The stent is placed to dilate (open) the ureter so the stone fragments can pass easily through the ureter or to decrease ureteral swelling after surgery, or to relieve an obstruction.    The stent is made of rubber.  The upper end of the stent curls in the kidney while the lower end rests in the bladder.    While the stent is in place you may experience the following symptoms:  Blood and/or small blood clots in urine.  Bladder spasm (frequency and urgency of urination).  Discomfort or aching in the back or side where the stent is.  Burning or discomfort at the end of urine stream.    To decrease these symptoms you should:  Take pain medication as prescribed.  Drink plenty of fluids.  If you experience pain at the end of urination try not emptying your bladder completely.  If having discomfort in back or side, decrease activity.    Please call your physician or the physician on call if you experience:  Fever greater than 101 .  Severe pain not relieved by pain medication or rest.      Please make an appointment for removal of the stent according to your physician s instructions.      **If you have questions or concerns about your procedure,   call Dr. Otero at [phone]**

## 2022-05-12 NOTE — OP NOTE
Procedure Date: 05/12/2022    SURGEON:  Carlos Oteor MD    PREOPERATIVE DIAGNOSIS:  Left kidney stone.     POSTOPERATIVE DIAGNOSIS:  Left kidney stone.     PROCEDURE PERFORMED:  Cystoscopy, left retrograde pyelogram, interpretation of fluoroscopic images, left ureteroscopy with holmium laser lithotripsy and stone basketing, placement of 5 x 24 double-J left ureteral stent.    ANESTHESIA:  General.    COMPLICATIONS:  None.    INDICATIONS FOR PROCEDURE:  Bonnie Welch is a 42-year-old woman with a left kidney stone and now presents for removal.     DESCRIPTION OF PROCEDURE:  The risks and benefits of the procedure were explained in detail to the patient and informed consent was obtained.  The patient was brought to the OP suite, placed supine on the table where she underwent general endotracheal anesthetic.  She was then moved down to the dorsal lithotomy position, where she was prepped and draped in the standard sterile fashion.    The procedure began by introducing the 22-Marshallese rigid cystoscope through the urethra and into the bladder, performing cystoscopy.  There were no urothelial abnormalities identified.  The left ureteral orifice was identified and cannulated with a ureteral catheter.  I took a  film; the stone was easily visible on  film.  I performed retrograde pyelogram and the retrograde pyelogram was found to be normal with no filling defects or dilatations present.  I passed a Glidewire into the left kidney, backed off the ureteral catheter along with the scope.  Alongside the Glidewire, I passed the rigid ureteroscope through the urethra into the bladder and up the left ureter.  The left ureter was free of any stones.  I passed a second Glidewire into the left kidney and backed off the rigid scope.  Over this second working Glidewire, I passed a 12/14-Marshallese x 36 cm access sheath to the level of the left ureteropelvic junction.  I removed the inner Glidewire and the inner stylet,  leaving the safety wire in place.  I then passed the Storz flexible ureteroscope through the access sheath and performed complete renoscopy.  There was a single stone seen in the lower pole of the left kidney.  I then used a 200 micron holmium laser fire to break up the stone into multiple fragments.  These fragments were then basketed out and sent for analysis.  Once all stone fragments were then basketed out, I performed another retrograde pyelogram through the scope and performed complete renoscopy to make certain all stones had been removed.  I removed the ureteroscope.  I removed the ureteral access sheath.  Over the remaining Glidewire, I passed a 5 x 24 double-J stent.  The wire was pulled back and a good curl was seen in the renal pelvis on fluoroscopy.  I reinserted the cystoscope and good curl was seen in the bladder under direct visualization.  The bladder was drained, scope was removed.  I placed a B and O suppository at the end the case.    The patient tolerated the procedure without complications.  She went to post-anesthetic care in good condition.  She will go home from there.  I will see her back in 1 week for stent removal in the office.    Carlos Otero MD        D: 2022   T: 2022   MT: JEREL    Name:     LELE DENNIS  MRN:      [id number]        Account:        [id number]   :      1980           Procedure Date: 2022     Document: [id number]

## 2022-05-12 NOTE — ANESTHESIA CARE TRANSFER NOTE
Patient: Bonnie Welch    Procedure: Procedure(s):  CYSTOSCOPY, LEFT URETEROSCPY, LEFT RETROGRADES, WITH HOLMIUM LASER LITHOTRIPSY,  LEFT URETERAL STENT INSERTION  Cystoscopy, retrogrades, combined       Diagnosis: Left ureteral stone [N20.1]  Diagnosis Additional Information: No value filed.    Anesthesia Type:   General     Note:    Oropharynx: oropharynx clear of all foreign objects  Level of Consciousness: awake  Oxygen Supplementation: face mask  Level of Supplemental Oxygen (L/min / FiO2): 6  Independent Airway: airway patency satisfactory and stable  Dentition: dentition unchanged  Vital Signs Stable: post-procedure vital signs reviewed and stable  Report to RN Given: handoff report given  Patient transferred to: PACU    Handoff Report: Identifed the Patient, Identified the Reponsible Provider, Reviewed the pertinent medical history, Discussed the surgical course, Reviewed Intra-OP anesthesia mangement and issues during anesthesia, Set expectations for post-procedure period and Allowed opportunity for questions and acknowledgement of understanding      Vitals:  Vitals Value Taken Time   /85 05/12/22 1215   Temp 36.7  C (98  F) 05/12/22 1201   Pulse 75 05/12/22 1227   Resp 14 05/12/22 1227   SpO2 98 % 05/12/22 1227   Vitals shown include unvalidated device data.    Electronically Signed By: RIVAS Masterson CRNA  May 12, 2022  12:28 PM

## 2022-05-16 LAB
APPEARANCE STONE: NORMAL
COMPN STONE: NORMAL
SPECIMEN WT: 42 MG

## 2022-05-19 ENCOUNTER — OFFICE VISIT (OUTPATIENT)
Dept: UROLOGY | Facility: CLINIC | Age: 42
End: 2022-05-19
Payer: COMMERCIAL

## 2022-05-19 VITALS
SYSTOLIC BLOOD PRESSURE: 118 MMHG | HEART RATE: 78 BPM | BODY MASS INDEX: 20.49 KG/M2 | WEIGHT: 120 LBS | DIASTOLIC BLOOD PRESSURE: 64 MMHG | HEIGHT: 64 IN

## 2022-05-19 DIAGNOSIS — N20.0 CALCULUS OF KIDNEY: ICD-10-CM

## 2022-05-19 DIAGNOSIS — Z79.2 PROPHYLACTIC ANTIBIOTIC: Primary | ICD-10-CM

## 2022-05-19 PROCEDURE — 52310 CYSTOSCOPY AND TREATMENT: CPT | Performed by: UROLOGY

## 2022-05-19 RX ORDER — TAMSULOSIN HYDROCHLORIDE 0.4 MG/1
0.4 CAPSULE ORAL DAILY
Qty: 3 CAPSULE | Refills: 0 | Status: ON HOLD | OUTPATIENT
Start: 2022-05-19 | End: 2023-03-27

## 2022-05-19 RX ORDER — CIPROFLOXACIN 500 MG/1
500 TABLET, FILM COATED ORAL ONCE
Qty: 1 TABLET | Refills: 0 | Status: SHIPPED | OUTPATIENT
Start: 2022-05-19 | End: 2022-05-19

## 2022-05-19 NOTE — PROGRESS NOTES
Office Visit Note  [facility]  [phone]    May 19, 2022    [unfilled]    1980    UROLOGIC DIAGNOSES:    Left kidney stone    CURRENT INTERVENTIONS:    S/P extraction    History:    Bonnie underwent left-sided kidney stone extraction in the operating room last week.  The stent has been bothersome for her but otherwise she has felt well.  Stone was composed of calcium oxalate mixed with calcium phosphate.  Serum calcium levels are normal.      Imaging:      Labs:      MEDICATIONS:    Current Outpatient Medications:      acetaminophen (TYLENOL) 325 MG tablet, Take 2 tablets (650 mg) by mouth every 4 hours as needed for mild pain or fever (greater than or equal to 38  C /100.4  F (oral) or 38.5  C/ 101.4  F (core).), Disp:  , Rfl:      Ascorbic Acid (VITAMIN C) 500 MG CAPS, Take 1,000 mg by mouth, Disp: , Rfl:      calcium-magnesium (CALMAG) 500-250 MG TABS per tablet, Take 1 tablet by mouth daily, Disp: , Rfl:      ciprofloxacin (CIPRO) 500 MG tablet, Take 1 tablet (500 mg) by mouth once for 1 dose, Disp: 1 tablet, Rfl: 0     multivitamin w/minerals (THERA-VIT-M) tablet, Take 1 tablet by mouth daily, Disp: , Rfl:      tamsulosin (FLOMAX) 0.4 MG capsule, Take 1 capsule (0.4 mg) by mouth daily, Disp: 3 capsule, Rfl: 0     Vitamin D, Cholecalciferol, 25 MCG (1000 UT) CAPS, , Disp: , Rfl:      senna-docusate (SENOKOT-S/PERICOLACE) 8.6-50 MG tablet, Take 1-2 tablets by mouth 2 times daily as needed for constipation, Disp: , Rfl:     ALLERGIES:     Allergies   Allergen Reactions     Aspirin      Ibuprofen        REVIEW OF SYSTEMS: Ten point review of systems without change as outlined in HPI    SURGICAL HISTORY:    Past Surgical History:   Procedure Laterality Date     CYSTOSCOPY, RETROGRADES, COMBINED  5/12/2022    Procedure: Cystoscopy, retrogrades, combined;  Surgeon: Carlos Otero MD;  Location: [facility] OR     LASER HOLMIUM LITHOTRIPSY URETER(S), INSERT STENT, COMBINED Left 5/12/2022     "Procedure: CYSTOSCOPY, LEFT URETEROSCPY, LEFT RETROGRADES, WITH HOLMIUM LASER LITHOTRIPSY,  LEFT URETERAL STENT INSERTION;  Surgeon: Carlos Otero MD;  Location:  OR         PHYSICAL EXAM:    /64   Pulse 78   Ht 1.626 m (5' 4\")   Wt 54.4 kg (120 lb)   LMP 05/06/2022 (Approximate)   BMI 20.60 kg/m      Constitutional: Well developed. Conversant and in no acute distress  Eyes: Anicteric sclera, conjunctiva clear, normal extraocular movements  ENT: Normocephalic and atraumatic,   Skin: Warm and dry. No rashes or lesions  Cardiac: No peripheral edema  Back/Flank: Not done  CNS/PNS: Normal musculature and movements, moves all extremities normally  Respiratory: Normal non-labored breathing  Abdomen: Soft nontender and nondistended  Peripheral Vascular: No peripheral edema  Mental Status/Psych: Alert and Oriented x 3. Normal mood and affect      External Genitalia: Not done  Bladder: Not done  Urethra: Not done   Vagina: Not done    Cystoscopy: I performed flexible cystoscopy today and removed the stent without difficulty      Urinalysis:  UA RESULTS:  Recent Labs   Lab Test 05/08/22  0544   COLOR Yellow   APPEARANCE Slightly Cloudy*   URINEGLC Negative   URINEBILI Negative   URINEKETONE 10 *   SG 1.030   UBLD Large*   URINEPH 5.5   PROTEIN 100 *   NITRITE Negative   LEUKEST Moderate*   RBCU 143*   WBCU 20*         IMPRESSION:    Doing well, stent removed    PLAN:    She will follow-up with us as needed in the future.      Carlos Otero M.D.        "

## 2022-05-19 NOTE — LETTER
5/19/2022       RE: Bonnie Welch  [address] [address]     Dear Colleague,    Thank you for referring your patient, Bonnie Welch, to the [facility] at [facility]. Please see a copy of my visit note below.    Office Visit Note  [facility]  [phone]    May 19, 2022    [unfilled]    1980    UROLOGIC DIAGNOSES:    Left kidney stone    CURRENT INTERVENTIONS:    S/P extraction    History:    Bonnie underwent left-sided kidney stone extraction in the operating room last week.  The stent has been bothersome for her but otherwise she has felt well.  Stone was composed of calcium oxalate mixed with calcium phosphate.  Serum calcium levels are normal.      Imaging:      Labs:      MEDICATIONS:    Current Outpatient Medications:      acetaminophen (TYLENOL) 325 MG tablet, Take 2 tablets (650 mg) by mouth every 4 hours as needed for mild pain or fever (greater than or equal to 38  C /100.4  F (oral) or 38.5  C/ 101.4  F (core).), Disp:  , Rfl:      Ascorbic Acid (VITAMIN C) 500 MG CAPS, Take 1,000 mg by mouth, Disp: , Rfl:      calcium-magnesium (CALMAG) 500-250 MG TABS per tablet, Take 1 tablet by mouth daily, Disp: , Rfl:      ciprofloxacin (CIPRO) 500 MG tablet, Take 1 tablet (500 mg) by mouth once for 1 dose, Disp: 1 tablet, Rfl: 0     multivitamin w/minerals (THERA-VIT-M) tablet, Take 1 tablet by mouth daily, Disp: , Rfl:      tamsulosin (FLOMAX) 0.4 MG capsule, Take 1 capsule (0.4 mg) by mouth daily, Disp: 3 capsule, Rfl: 0     Vitamin D, Cholecalciferol, 25 MCG (1000 UT) CAPS, , Disp: , Rfl:      senna-docusate (SENOKOT-S/PERICOLACE) 8.6-50 MG tablet, Take 1-2 tablets by mouth 2 times daily as needed for constipation, Disp: , Rfl:     ALLERGIES:     Allergies   Allergen Reactions     Aspirin      Ibuprofen        REVIEW OF SYSTEMS: Ten point review of systems without change as outlined in  "HPI    SURGICAL HISTORY:    Past Surgical History:   Procedure Laterality Date     CYSTOSCOPY, RETROGRADES, COMBINED  5/12/2022    Procedure: Cystoscopy, retrogrades, combined;  Surgeon: Carlos Otero MD;  Location:  OR     LASER HOLMIUM LITHOTRIPSY URETER(S), INSERT STENT, COMBINED Left 5/12/2022    Procedure: CYSTOSCOPY, LEFT URETEROSCPY, LEFT RETROGRADES, WITH HOLMIUM LASER LITHOTRIPSY,  LEFT URETERAL STENT INSERTION;  Surgeon: Carlos Otero MD;  Location:  OR         PHYSICAL EXAM:    /64   Pulse 78   Ht 1.626 m (5' 4\")   Wt 54.4 kg (120 lb)   LMP 05/06/2022 (Approximate)   BMI 20.60 kg/m      Constitutional: Well developed. Conversant and in no acute distress  Eyes: Anicteric sclera, conjunctiva clear, normal extraocular movements  ENT: Normocephalic and atraumatic,   Skin: Warm and dry. No rashes or lesions  Cardiac: No peripheral edema  Back/Flank: Not done  CNS/PNS: Normal musculature and movements, moves all extremities normally  Respiratory: Normal non-labored breathing  Abdomen: Soft nontender and nondistended  Peripheral Vascular: No peripheral edema  Mental Status/Psych: Alert and Oriented x 3. Normal mood and affect      External Genitalia: Not done  Bladder: Not done  Urethra: Not done   Vagina: Not done    Cystoscopy: I performed flexible cystoscopy today and removed the stent without difficulty      Urinalysis:  UA RESULTS:  Recent Labs   Lab Test 05/08/22  0544   COLOR Yellow   APPEARANCE Slightly Cloudy*   URINEGLC Negative   URINEBILI Negative   URINEKETONE 10 *   SG 1.030   UBLD Large*   URINEPH 5.5   PROTEIN 100 *   NITRITE Negative   LEUKEST Moderate*   RBCU 143*   WBCU 20*         IMPRESSION:    Doing well, stent removed    PLAN:    She will follow-up with us as needed in the future.      Carlos Otero M.D.    "

## 2022-05-21 ENCOUNTER — HEALTH MAINTENANCE LETTER (OUTPATIENT)
Age: 42
End: 2022-05-21

## 2022-09-16 NOTE — MR AVS SNAPSHOT
"              After Visit Summary   2018    Bonnie Welch    MRN: [id number]           Patient Information     Date Of Birth          1980        Visit Information        Provider Department      2018 10:00 AM Janina Gregory, DO [facility] Maternal Fetal [facility]        Today's Diagnoses     Elderly multigravida in second trimester    -  1       Follow-ups after your visit        Who to contact     If you have questions or need follow up information about today's clinic visit or your schedule please contact [facility] MATERNAL FETAL MEDICINE [facility] directly at [phone].  Normal or non-critical lab and imaging results will be communicated to you by Pomelohart, letter or phone within 4 business days after the clinic has received the results. If you do not hear from us within 7 days, please contact the clinic through CityF[facility]ret or phone. If you have a critical or abnormal lab result, we will notify you by phone as soon as possible.  Submit refill requests through HStreaming or call your pharmacy and they will forward the refill request to us. Please allow 3 business days for your refill to be completed.          Additional Information About Your Visit        MyChart Information     HStreaming lets you send messages to your doctor, view your test results, renew your prescriptions, schedule appointments and more. To sign up, go to [email or web address] . Click on \"Log in\" on the left side of the screen, which will take you to the Welcome page. Then click on \"Sign up Now\" on the right side of the page.     You will be asked to enter the access code listed below, as well as some personal information. Please follow the directions to create your username and password.     Your access code is: [email or web address]  Expires: 2018 10:20 AM     Your access code will  in 90 days. If you need help or a new code, please call your [facility] clinic or [phone].        Care EveryWhere ID     This " is your Care EveryWhere ID. This could be used by other organizations to access your [facility] medical records  [id number]        Your Vitals Were     Last Period                   05/21/2018            Blood Pressure from Last 3 Encounters:   No data found for BP    Weight from Last 3 Encounters:   No data found for Wt              Today, you had the following     No orders found for display       Primary Care Provider Office Phone # Fax #    Laura Irizarry [phone]       [facility] [address]  [place] [address]        Equal Access to Services     [facility]: Hadii aad ku hadasho Soomaali, waaxda luqadaha, qaybta kaalmada adeegyada, waxay idiin hayaan adelacie canas . So [facility] [phone].    ATENCIÓN: Si habla español, tiene a villasenor disposición servicios gratuitos de asistencia lingüística. Llame al [phone].    We comply with applicable federal civil rights laws and [place] laws. We do not discriminate on the basis of race, color, national origin, age, disability, sex, sexual orientation, or gender identity.            Thank you!     Thank you for choosing [facility]  for your care. Our goal is always to provide you with excellent care. Hearing back from our patients is one way we can continue to improve our services. Please take a few minutes to complete the written survey that you may receive in the mail after your visit with us. Thank you!             Your Updated Medication List - Protect others around you: Learn how to safely use, store and throw away your medicines at www.disposemymeds.org.      Notice  As of 9/24/2018 10:20 AM    You have not been prescribed any medications.       successfully removed  Dilated Rt ureter noted  Left ureter was thin walled   Bilateral distal ureteral margins were negative  Successful cystoprostatectomy and bilateral pelvic lymph node dissection  Successful ileal conduit creation       SPECIMEN:  Bladder with prostate and seminal vesicles and vas, bilateral pelvic lymph  nodes, distal right and left ureter for frozen and permanent, urethral  margin for permanent. INDICATIONS FOR PROCEDURE:  Mt García is a 73 y.o. male presents today for robotic assisted radical cystoprostatectomy with bilateral pelvic lymphadenectomy and ileal conduit. After risks, benefits and alternatives of the procedure were discussed with the patient he elected to proceed. PROCEDURE:  The patient was properly identified in the preoperative area and taken to the operating room and placed on the table in the supine position. General anesthesia was induced and the patient's arms were tucked and all pressure points were padded. EPC cuffs were placed and activated. IV antibiotics with iv ancef were given preoperatively. Preoperative heparin was also given. The patient was first placed in a dorsal lithotomy position, the cystoscopy was performed using a 22 Portuguese sheath, 30 degree scope, indwelling right ureteral stent was found to be calcified and this was removed using a flexible grasper passed through the working channel of the hysteroscope. The patient was placed into a steep Trendelenburg position and prepped and draped in the standard fashion. A Muñoz catheter was placed in the bladder. A urine culture was obtained. Veress needle was placed through a supraumbilical incision into the peritoneum to obtain pneumoperitoneum to 15 mmHg. An 8 mm robotic port was placed and all other robotic ports were left in in the usual fashion, including 2 assistant ports on the patient's right-hand side. The robotic was docked.  The bowel was taken down out of the pelvis releasing any sigmoid attachment. The peritoneum overlying the left ureter was incised and the ureter was mobilized down from just cephalad to the bifurcation of the iliac arteries down to the insertion into the bladder. We placed 2 Hem-o-bran clips distally and 1 Hemoclip marked with a silk suture proximally and divided the ureter in between. A segment was sent for frozen and this did come back negative for cancer. The right ureter was mobilized in the same fashion from just cephalad to the bifurcation of the iliac artery down to the bladder cuff. It was ligated with Hem-O-Bran clips x 2 proximally and distally as it was dilated and transected and the distal margin was sent which was negative. We then applied a Hem-o-bran clip with Vicryl suture for marking. The peritoneum was then incised between 2 ureteral hiati. The rectum was swept posteriorly and the bladder was kept anteriorly. We took the posterior dissection as far distally as possible through Denonvilliers fascia keeping the seminal vesicles and vas anterior and sweeping all the way behind the prostate to the apex of the prostate. This defined the pedicles of the bladder on the posterior plane. The lateral planes of the bladder were identified by entering the space of Retzius just lateral to the medial umbilical ligaments on each side and dissecting down under the pubic bone toward the apex of the prostate. Urachus was kept intact and the pedicles of the bladder and prostate were taken using vessel sealer passed through the right working arm. The urachus was then taken down and bladder was taken down from the midline through the apex of the prostate. Dorsovenous complex was ligated with 0 V-Loc suture and tacked to the pubic symphysis. We dissected distal to the apex of the prostate and identified the urethra. Muñoz catheter was removed and an extra-large Hem-O-Bran clip was placed across the proximal urethra.   The urethra was transected and the bladder and prostate specimen was removed and placed into a bag. Hemostasis was achieved in the bed of the bladder and prostate. Surgicel powder and film were placed into the pelvis and good hemostasis was ensured. The distal urethra was then transfixed and ligated using 3 OV lock sutures. pelvic lymphadenectomy was then performed by removing the lymphatic tissue from the genitofemoral nerve cephalad to the obturator nerve, caudad lateral pelvic side laterally overlying the external iliac artery and vein up to the bifurcation of the external and internal iliac artery. Lymph node dissection was performed bilaterally. The specimens were removed and sent separately for histopathological examination. Robot was then undocked and infraumbilical incision was made and carried down into the peritoneum. Insufflation was turned off and the specimens were removed from the incision. The ureters were identified and pulled up and marked with hemostat on the suture tag. We then identified the ileocecal junction and measured approximately 20 cm proximal to this on the ileum. We measured another 20 cm segment and then identified a vascular pedicle in the mesentery. We identified a window in the mesentery and used a hemostat to poke through proximally and then distally toward the bowel on the good mesentery. LigaSure was used to ligate and incise through the mesenteryand blood vessels were controlled. NATA Stapler was taken across the segment of bowel with the good vascular pedicle both proximally and distally. The conduit was placed inferiorly and the bowel anastomosis was then performed. Each corner was cut with a curved Balbuena scissors, and the stapler was placed on the antimesenteric side with the mesentery inferiorly. Ends of the bowel were stapled together in a side-to-side fashion. 2-0 silk sutures were taken across the ends and the TA stapler was used to seal the ends of the bowel and the excess bowel was cut off with curved Balbuena.  Mesenteric window was closed with 4 - 0 Vicryl suture to prevent internal hernia. The TA staple line was oversewn with 3-0 silk as well to keep it in place. We then turned our attention to the conduit. We checked its vascular pedicle. It appeared good and the conduit appeared pink throughout the case. The butt end of the conduit was identified and a segment was identified and placed through left ureter, picked up, cauterized and incised. The left ureter was identified and a distal segment was cut off and used as a handle and the angled Clifton scissors were used to spatulate the ureter. 4-0 Vicryl was used in each corner of the crotch and then placed through the bowel and these were tied down. The anastomosis was then performed with a running 4-0 Vicryl approximately half way and then a stent was placed through the conduit with the aid of a plastic suction tip and into the ureter. Wire was removed and stent left in place. However, the left ureter was very thin-walled and the anastomosis was leaking. Hence, we cut through the anastomosis and revised the ureteral margin and again performed an anastomosis in a similar fashion and this time there was no leak. The same was performed with the right ureter just distal along the conduit and it was sewn into place in the same fashion. Both the stents were fixed to the ureter using 2-0 chromic suture passed through the stent and tied loosely across the ureteral wall. Circular incision was then made on the abdomen and a previously marked spot for the urostomy. We dissected down to the fascia and a cruciate incision was made to the fascia. The muscle was split and the peritoneum was incised. Two fingers were able to be placed through the incision. Rudean Gist was placed through the skin and the bowel was grasped and pulled up.  The distal end of the conduit was then sewn into place with a bite through the mucosa, a bite proximally through heber and rosebud it, and then a bite through the skin to hold it in place and these were tied down in 2 areas. This was done with 3-0 Vicryl. Then interrupted sutures were placed circumferentially to tack down the mucosa to the skin edge. We then looked back at the bowel anastomosis which appeared well. Fascia was then closed with #1 Vicryl figure-of-eight sutures. All wounds were irrigated and skin incisions were closed with 4-0 Monocryl subcuticular or deep dermal sutures. Dermabond glue was applied. Urostomy bag was applied. This is the end of the procedure. The patient tolerated the procedure well and was awoken from anesthesia and taken to recovery room in good condition. Dr. Judson Sims   was present and scrubbed for all key portions of the procedure.      Electronically signed by Puneet Dumont MD on 9/16/2022 at 6:31 PM

## 2022-09-18 ENCOUNTER — HEALTH MAINTENANCE LETTER (OUTPATIENT)
Age: 42
End: 2022-09-18

## 2022-10-14 ENCOUNTER — MEDICAL CORRESPONDENCE (OUTPATIENT)
Dept: HEALTH INFORMATION MANAGEMENT | Facility: CLINIC | Age: 42
End: 2022-10-14

## 2022-10-18 ENCOUNTER — TRANSFERRED RECORDS (OUTPATIENT)
Dept: HEALTH INFORMATION MANAGEMENT | Facility: CLINIC | Age: 42
End: 2022-10-18

## 2022-10-18 ENCOUNTER — TRANSCRIBE ORDERS (OUTPATIENT)
Dept: MATERNAL FETAL MEDICINE | Facility: CLINIC | Age: 42
End: 2022-10-18

## 2022-10-18 DIAGNOSIS — O26.90 PREGNANCY RELATED CONDITION, ANTEPARTUM: Primary | ICD-10-CM

## 2022-11-07 ENCOUNTER — PRE VISIT (OUTPATIENT)
Dept: MATERNAL FETAL MEDICINE | Facility: CLINIC | Age: 42
End: 2022-11-07

## 2022-11-14 ENCOUNTER — OFFICE VISIT (OUTPATIENT)
Dept: MATERNAL FETAL MEDICINE | Facility: CLINIC | Age: 42
End: 2022-11-14
Attending: OBSTETRICS & GYNECOLOGY
Payer: COMMERCIAL

## 2022-11-14 ENCOUNTER — HOSPITAL ENCOUNTER (OUTPATIENT)
Dept: ULTRASOUND IMAGING | Facility: CLINIC | Age: 42
Discharge: HOME | End: 2022-11-14
Attending: OBSTETRICS & GYNECOLOGY
Payer: COMMERCIAL

## 2022-11-14 DIAGNOSIS — Z82.79 FAMILY HISTORY OF CONGENITAL HEART DEFECT: ICD-10-CM

## 2022-11-14 DIAGNOSIS — O26.90 PREGNANCY RELATED CONDITION, ANTEPARTUM: ICD-10-CM

## 2022-11-14 DIAGNOSIS — O09.522 AMA (ADVANCED MATERNAL AGE) MULTIGRAVIDA 35+, SECOND TRIMESTER: Primary | ICD-10-CM

## 2022-11-14 DIAGNOSIS — O44.00 PLACENTA PREVIA ANTEPARTUM: ICD-10-CM

## 2022-11-14 PROCEDURE — 76811 OB US DETAILED SNGL FETUS: CPT

## 2022-11-14 PROCEDURE — 76811 OB US DETAILED SNGL FETUS: CPT | Mod: 26 | Performed by: OBSTETRICS & GYNECOLOGY

## 2022-11-14 PROCEDURE — 76817 TRANSVAGINAL US OBSTETRIC: CPT | Mod: 26 | Performed by: OBSTETRICS & GYNECOLOGY

## 2022-11-14 PROCEDURE — 99214 OFFICE O/P EST MOD 30 MIN: CPT | Mod: 25 | Performed by: OBSTETRICS & GYNECOLOGY

## 2022-11-14 NOTE — PROGRESS NOTES
"Please see \"Imaging\" tab under \"Chart Review\" for details of today's visit.    Steve Pleitez    "

## 2022-12-12 ENCOUNTER — OFFICE VISIT (OUTPATIENT)
Dept: MATERNAL FETAL MEDICINE | Facility: CLINIC | Age: 42
End: 2022-12-12
Attending: OBSTETRICS & GYNECOLOGY
Payer: COMMERCIAL

## 2022-12-12 ENCOUNTER — HOSPITAL ENCOUNTER (OUTPATIENT)
Dept: ULTRASOUND IMAGING | Facility: CLINIC | Age: 42
Discharge: HOME | End: 2022-12-12
Attending: OBSTETRICS & GYNECOLOGY
Payer: COMMERCIAL

## 2022-12-12 DIAGNOSIS — O44.00 PLACENTA PREVIA ANTEPARTUM: ICD-10-CM

## 2022-12-12 DIAGNOSIS — Z36.2 ENCOUNTER FOR FOLLOW-UP ULTRASOUND OF FETAL ANATOMY: Primary | ICD-10-CM

## 2022-12-12 PROCEDURE — 76816 OB US FOLLOW-UP PER FETUS: CPT

## 2022-12-12 PROCEDURE — 76816 OB US FOLLOW-UP PER FETUS: CPT | Mod: 26 | Performed by: OBSTETRICS & GYNECOLOGY

## 2022-12-12 NOTE — PROGRESS NOTES
"Please see \"imaging\" tab under \"Chart Review\" for details of today's US at the [facility].    Ronn Alcantara MD  Maternal-Fetal Medicine    "

## 2022-12-16 ENCOUNTER — OFFICE VISIT (OUTPATIENT)
Dept: CARDIOLOGY | Facility: CLINIC | Age: 42
End: 2022-12-16
Payer: COMMERCIAL

## 2022-12-16 ENCOUNTER — HOSPITAL ENCOUNTER (OUTPATIENT)
Dept: CARDIOLOGY | Facility: CLINIC | Age: 42
Discharge: HOME | End: 2022-12-16
Attending: OBSTETRICS & GYNECOLOGY | Admitting: OBSTETRICS & GYNECOLOGY
Payer: COMMERCIAL

## 2022-12-16 DIAGNOSIS — Z82.79 FAMILY HISTORY OF CONGENITAL HEART DEFECT: Primary | ICD-10-CM

## 2022-12-16 DIAGNOSIS — Z82.79 FAMILY HISTORY OF CONGENITAL HEART DEFECT: ICD-10-CM

## 2022-12-16 PROCEDURE — 76827 ECHO EXAM OF FETAL HEART: CPT | Mod: 26 | Performed by: PEDIATRICS

## 2022-12-16 PROCEDURE — 99204 OFFICE O/P NEW MOD 45 MIN: CPT | Mod: 25 | Performed by: PEDIATRICS

## 2022-12-16 PROCEDURE — 76825 ECHO EXAM OF FETAL HEART: CPT | Mod: 26 | Performed by: PEDIATRICS

## 2022-12-16 PROCEDURE — 93325 DOPPLER ECHO COLOR FLOW MAPG: CPT | Mod: 26 | Performed by: PEDIATRICS

## 2022-12-16 PROCEDURE — 93325 DOPPLER ECHO COLOR FLOW MAPG: CPT

## 2022-12-16 NOTE — PROGRESS NOTES
Fetal Cardiology Consultation    Patient:  Bonnie Welch MRN:  [id number]   YOB: 1980 Age:  42 year old   Date of Visit:  2022 PCP:  Laura Irizarry MD   OLI: 3/31/2023, by Last Menstrual Period EGA: 25w0d weeks     Dear Doctor:    I had the pleasure of seeing Bonnie Welch at the [facility] Fetal [facility]chocardi[facility] in [place] on 2022 in consultation for fetal echocardiography results. She presented today accompanied by her partner. As you know, she is a 42 year old female with a previous child with bicuspid aortic valve and muscular ventricular septal defects (I also care for this 4-year-old).    The fetal echocardiogram was normal. Normal fetal cardiac anatomy. Normal right and left ventricular size and function without hypertrophy. No evidence of diastolic dysfunction. No pericardial effusion. No arrhythmia.     I reviewed and interpreted the fetal echocardiogram today. I discussed the normal results with Ms. Welch and her partner. While these results are normal, it is important to note that fetal echocardiography cannot exclude small atrial or ventricular septal defects, persistent ductus arteriosus, mild coarctation of the aorta, partial anomalous pulmonary venous return, minor anatomic valve anomalies, or coronary artery anomalies.     -- A post-donna echocardiogram is recommended within several months after delivery to screen for bicuspid aortic valve.    Thank you for allowing me to participate in Ms. Welch's care. Please don't hesitate to contact me or the Fetal Cardiology team at [facility] with any questions or concerns.     I spent a total of 40 minutes on the date of the encounter doing chart review, patient history, documentation, counseling, and coordinating care.    Alexander Granado MD  Pediatric Cardiology  [facility]  Phone [phone]    Review of the  result(s) of each unique test - fetal echocardiogram

## 2023-03-27 ENCOUNTER — HOSPITAL ENCOUNTER (INPATIENT)
Facility: CLINIC | Age: 43
LOS: 2 days | Discharge: HOME HEALTH | End: 2023-03-29
Attending: OBSTETRICS & GYNECOLOGY | Admitting: OBSTETRICS & GYNECOLOGY
Payer: COMMERCIAL

## 2023-03-27 ENCOUNTER — ANESTHESIA (OUTPATIENT)
Dept: OBGYN | Facility: CLINIC | Age: 43
End: 2023-03-27
Payer: COMMERCIAL

## 2023-03-27 ENCOUNTER — ANESTHESIA EVENT (OUTPATIENT)
Dept: OBGYN | Facility: CLINIC | Age: 43
End: 2023-03-27
Payer: COMMERCIAL

## 2023-03-27 DIAGNOSIS — Z3A.39 39 WEEKS GESTATION OF PREGNANCY: ICD-10-CM

## 2023-03-27 PROBLEM — Z37.9 NORMAL LABOR: Status: ACTIVE | Noted: 2023-03-27

## 2023-03-27 LAB
ABO/RH(D): NORMAL
ANTIBODY SCREEN: NEGATIVE
ERYTHROCYTE [DISTWIDTH] IN BLOOD BY AUTOMATED COUNT: 13.1 % (ref 10–15)
HCT VFR BLD AUTO: 41.1 % (ref 35–47)
HGB BLD-MCNC: 14.2 G/DL (ref 11.7–15.7)
MCH RBC QN AUTO: 31.2 PG (ref 26.5–33)
MCHC RBC AUTO-ENTMCNC: 34.5 G/DL (ref 31.5–36.5)
MCV RBC AUTO: 90 FL (ref 78–100)
PLATELET # BLD AUTO: 272 10E3/UL (ref 150–450)
RBC # BLD AUTO: 4.55 10E6/UL (ref 3.8–5.2)
SPECIMEN EXPIRATION DATE: NORMAL
T PALLIDUM AB SER QL: NONREACTIVE
WBC # BLD AUTO: 8.6 10E3/UL (ref 4–11)

## 2023-03-27 PROCEDURE — 370N000003 HC ANESTHESIA WARD SERVICE

## 2023-03-27 PROCEDURE — 3E0R3BZ INTRODUCTION OF ANESTHETIC AGENT INTO SPINAL CANAL, PERCUTANEOUS APPROACH: ICD-10-PCS | Performed by: ANESTHESIOLOGY

## 2023-03-27 PROCEDURE — 10907ZC DRAINAGE OF AMNIOTIC FLUID, THERAPEUTIC FROM PRODUCTS OF CONCEPTION, VIA NATURAL OR ARTIFICIAL OPENING: ICD-10-PCS | Performed by: OBSTETRICS & GYNECOLOGY

## 2023-03-27 PROCEDURE — 85027 COMPLETE CBC AUTOMATED: CPT | Performed by: OBSTETRICS & GYNECOLOGY

## 2023-03-27 PROCEDURE — 00HU33Z INSERTION OF INFUSION DEVICE INTO SPINAL CANAL, PERCUTANEOUS APPROACH: ICD-10-PCS | Performed by: ANESTHESIOLOGY

## 2023-03-27 PROCEDURE — 0KQM0ZZ REPAIR PERINEUM MUSCLE, OPEN APPROACH: ICD-10-PCS | Performed by: OBSTETRICS & GYNECOLOGY

## 2023-03-27 PROCEDURE — 86850 RBC ANTIBODY SCREEN: CPT | Performed by: OBSTETRICS & GYNECOLOGY

## 2023-03-27 PROCEDURE — 722N000001 HC LABOR CARE VAGINAL DELIVERY SINGLE

## 2023-03-27 PROCEDURE — 3E033VJ INTRODUCTION OF OTHER HORMONE INTO PERIPHERAL VEIN, PERCUTANEOUS APPROACH: ICD-10-PCS | Performed by: OBSTETRICS & GYNECOLOGY

## 2023-03-27 PROCEDURE — 250N000011 HC RX IP 250 OP 636: Performed by: ANESTHESIOLOGY

## 2023-03-27 PROCEDURE — 36415 COLL VENOUS BLD VENIPUNCTURE: CPT | Performed by: OBSTETRICS & GYNECOLOGY

## 2023-03-27 PROCEDURE — 120N000012 HC R&B POSTPARTUM

## 2023-03-27 PROCEDURE — 86780 TREPONEMA PALLIDUM: CPT | Performed by: OBSTETRICS & GYNECOLOGY

## 2023-03-27 PROCEDURE — 250N000009 HC RX 250: Performed by: OBSTETRICS & GYNECOLOGY

## 2023-03-27 PROCEDURE — 258N000003 HC RX IP 258 OP 636: Performed by: OBSTETRICS & GYNECOLOGY

## 2023-03-27 PROCEDURE — 250N000013 HC RX MED GY IP 250 OP 250 PS 637: Performed by: OBSTETRICS & GYNECOLOGY

## 2023-03-27 RX ORDER — NALOXONE HYDROCHLORIDE 0.4 MG/ML
0.2 INJECTION, SOLUTION INTRAMUSCULAR; INTRAVENOUS; SUBCUTANEOUS
Status: DISCONTINUED | OUTPATIENT
Start: 2023-03-27 | End: 2023-03-27 | Stop reason: HOSPADM

## 2023-03-27 RX ORDER — NALOXONE HYDROCHLORIDE 0.4 MG/ML
0.2 INJECTION, SOLUTION INTRAMUSCULAR; INTRAVENOUS; SUBCUTANEOUS
Status: DISCONTINUED | OUTPATIENT
Start: 2023-03-27 | End: 2023-03-29 | Stop reason: HOSPADM

## 2023-03-27 RX ORDER — MISOPROSTOL 200 UG/1
400 TABLET ORAL
Status: DISCONTINUED | OUTPATIENT
Start: 2023-03-27 | End: 2023-03-27 | Stop reason: HOSPADM

## 2023-03-27 RX ORDER — NALOXONE HYDROCHLORIDE 0.4 MG/ML
0.4 INJECTION, SOLUTION INTRAMUSCULAR; INTRAVENOUS; SUBCUTANEOUS
Status: DISCONTINUED | OUTPATIENT
Start: 2023-03-27 | End: 2023-03-29 | Stop reason: HOSPADM

## 2023-03-27 RX ORDER — MISOPROSTOL 200 UG/1
800 TABLET ORAL
Status: DISCONTINUED | OUTPATIENT
Start: 2023-03-27 | End: 2023-03-27 | Stop reason: HOSPADM

## 2023-03-27 RX ORDER — METOCLOPRAMIDE 10 MG/1
10 TABLET ORAL EVERY 6 HOURS PRN
Status: DISCONTINUED | OUTPATIENT
Start: 2023-03-27 | End: 2023-03-27 | Stop reason: HOSPADM

## 2023-03-27 RX ORDER — PROCHLORPERAZINE 25 MG
25 SUPPOSITORY, RECTAL RECTAL EVERY 12 HOURS PRN
Status: DISCONTINUED | OUTPATIENT
Start: 2023-03-27 | End: 2023-03-27 | Stop reason: HOSPADM

## 2023-03-27 RX ORDER — MODIFIED LANOLIN
OINTMENT (GRAM) TOPICAL
Status: DISCONTINUED | OUTPATIENT
Start: 2023-03-27 | End: 2023-03-29 | Stop reason: HOSPADM

## 2023-03-27 RX ORDER — DOCUSATE SODIUM 100 MG/1
100 CAPSULE, LIQUID FILLED ORAL DAILY
Status: DISCONTINUED | OUTPATIENT
Start: 2023-03-27 | End: 2023-03-29 | Stop reason: HOSPADM

## 2023-03-27 RX ORDER — OXYTOCIN/0.9 % SODIUM CHLORIDE 30/500 ML
100-340 PLASTIC BAG, INJECTION (ML) INTRAVENOUS CONTINUOUS PRN
Status: DISCONTINUED | OUTPATIENT
Start: 2023-03-27 | End: 2023-03-29 | Stop reason: HOSPADM

## 2023-03-27 RX ORDER — CARBOPROST TROMETHAMINE 250 UG/ML
250 INJECTION, SOLUTION INTRAMUSCULAR
Status: DISCONTINUED | OUTPATIENT
Start: 2023-03-27 | End: 2023-03-27 | Stop reason: HOSPADM

## 2023-03-27 RX ORDER — OXYTOCIN 10 [USP'U]/ML
10 INJECTION, SOLUTION INTRAMUSCULAR; INTRAVENOUS
Status: DISCONTINUED | OUTPATIENT
Start: 2023-03-27 | End: 2023-03-27 | Stop reason: HOSPADM

## 2023-03-27 RX ORDER — OXYCODONE HYDROCHLORIDE 5 MG/1
5 TABLET ORAL EVERY 4 HOURS PRN
Status: DISCONTINUED | OUTPATIENT
Start: 2023-03-27 | End: 2023-03-29 | Stop reason: HOSPADM

## 2023-03-27 RX ORDER — OXYTOCIN/0.9 % SODIUM CHLORIDE 30/500 ML
340 PLASTIC BAG, INJECTION (ML) INTRAVENOUS CONTINUOUS PRN
Status: DISCONTINUED | OUTPATIENT
Start: 2023-03-27 | End: 2023-03-29 | Stop reason: HOSPADM

## 2023-03-27 RX ORDER — OXYTOCIN/0.9 % SODIUM CHLORIDE 30/500 ML
340 PLASTIC BAG, INJECTION (ML) INTRAVENOUS CONTINUOUS PRN
Status: DISCONTINUED | OUTPATIENT
Start: 2023-03-27 | End: 2023-03-27 | Stop reason: HOSPADM

## 2023-03-27 RX ORDER — MISOPROSTOL 200 UG/1
800 TABLET ORAL
Status: DISCONTINUED | OUTPATIENT
Start: 2023-03-27 | End: 2023-03-29 | Stop reason: HOSPADM

## 2023-03-27 RX ORDER — ONDANSETRON 2 MG/ML
4 INJECTION INTRAMUSCULAR; INTRAVENOUS EVERY 6 HOURS PRN
Status: DISCONTINUED | OUTPATIENT
Start: 2023-03-27 | End: 2023-03-27 | Stop reason: HOSPADM

## 2023-03-27 RX ORDER — METOCLOPRAMIDE HYDROCHLORIDE 5 MG/ML
10 INJECTION INTRAMUSCULAR; INTRAVENOUS EVERY 6 HOURS PRN
Status: DISCONTINUED | OUTPATIENT
Start: 2023-03-27 | End: 2023-03-27 | Stop reason: HOSPADM

## 2023-03-27 RX ORDER — ACETAMINOPHEN 325 MG/1
650 TABLET ORAL EVERY 4 HOURS PRN
Status: DISCONTINUED | OUTPATIENT
Start: 2023-03-27 | End: 2023-03-29 | Stop reason: HOSPADM

## 2023-03-27 RX ORDER — NALBUPHINE HYDROCHLORIDE 20 MG/ML
2.5-5 INJECTION, SOLUTION INTRAMUSCULAR; INTRAVENOUS; SUBCUTANEOUS EVERY 6 HOURS PRN
Status: DISCONTINUED | OUTPATIENT
Start: 2023-03-27 | End: 2023-03-29 | Stop reason: HOSPADM

## 2023-03-27 RX ORDER — TRANEXAMIC ACID 10 MG/ML
1 INJECTION, SOLUTION INTRAVENOUS EVERY 30 MIN PRN
Status: DISCONTINUED | OUTPATIENT
Start: 2023-03-27 | End: 2023-03-29 | Stop reason: HOSPADM

## 2023-03-27 RX ORDER — OXYTOCIN 10 [USP'U]/ML
10 INJECTION, SOLUTION INTRAMUSCULAR; INTRAVENOUS
Status: DISCONTINUED | OUTPATIENT
Start: 2023-03-27 | End: 2023-03-29 | Stop reason: HOSPADM

## 2023-03-27 RX ORDER — METHYLERGONOVINE MALEATE 0.2 MG/ML
200 INJECTION INTRAVENOUS
Status: DISCONTINUED | OUTPATIENT
Start: 2023-03-27 | End: 2023-03-29 | Stop reason: HOSPADM

## 2023-03-27 RX ORDER — PROCHLORPERAZINE MALEATE 5 MG
10 TABLET ORAL EVERY 6 HOURS PRN
Status: DISCONTINUED | OUTPATIENT
Start: 2023-03-27 | End: 2023-03-27 | Stop reason: HOSPADM

## 2023-03-27 RX ORDER — SODIUM CHLORIDE, SODIUM LACTATE, POTASSIUM CHLORIDE, CALCIUM CHLORIDE 600; 310; 30; 20 MG/100ML; MG/100ML; MG/100ML; MG/100ML
INJECTION, SOLUTION INTRAVENOUS CONTINUOUS PRN
Status: DISCONTINUED | OUTPATIENT
Start: 2023-03-27 | End: 2023-03-27 | Stop reason: HOSPADM

## 2023-03-27 RX ORDER — NALOXONE HYDROCHLORIDE 0.4 MG/ML
0.4 INJECTION, SOLUTION INTRAMUSCULAR; INTRAVENOUS; SUBCUTANEOUS
Status: DISCONTINUED | OUTPATIENT
Start: 2023-03-27 | End: 2023-03-27 | Stop reason: HOSPADM

## 2023-03-27 RX ORDER — CITRIC ACID/SODIUM CITRATE 334-500MG
30 SOLUTION, ORAL ORAL
Status: DISCONTINUED | OUTPATIENT
Start: 2023-03-27 | End: 2023-03-27 | Stop reason: HOSPADM

## 2023-03-27 RX ORDER — FENTANYL CITRATE-0.9 % NACL/PF 10 MCG/ML
100 PLASTIC BAG, INJECTION (ML) INTRAVENOUS EVERY 5 MIN PRN
Status: DISCONTINUED | OUTPATIENT
Start: 2023-03-27 | End: 2023-03-27 | Stop reason: HOSPADM

## 2023-03-27 RX ORDER — HYDROCORTISONE 25 MG/G
CREAM TOPICAL 3 TIMES DAILY PRN
Status: DISCONTINUED | OUTPATIENT
Start: 2023-03-27 | End: 2023-03-29 | Stop reason: HOSPADM

## 2023-03-27 RX ORDER — MISOPROSTOL 200 UG/1
400 TABLET ORAL
Status: DISCONTINUED | OUTPATIENT
Start: 2023-03-27 | End: 2023-03-29 | Stop reason: HOSPADM

## 2023-03-27 RX ORDER — TRANEXAMIC ACID 10 MG/ML
1 INJECTION, SOLUTION INTRAVENOUS EVERY 30 MIN PRN
Status: DISCONTINUED | OUTPATIENT
Start: 2023-03-27 | End: 2023-03-27 | Stop reason: HOSPADM

## 2023-03-27 RX ORDER — METHYLERGONOVINE MALEATE 0.2 MG/ML
200 INJECTION INTRAVENOUS
Status: DISCONTINUED | OUTPATIENT
Start: 2023-03-27 | End: 2023-03-27 | Stop reason: HOSPADM

## 2023-03-27 RX ORDER — LIDOCAINE 40 MG/G
CREAM TOPICAL
Status: DISCONTINUED | OUTPATIENT
Start: 2023-03-27 | End: 2023-03-27 | Stop reason: HOSPADM

## 2023-03-27 RX ORDER — CARBOPROST TROMETHAMINE 250 UG/ML
250 INJECTION, SOLUTION INTRAMUSCULAR
Status: DISCONTINUED | OUTPATIENT
Start: 2023-03-27 | End: 2023-03-29 | Stop reason: HOSPADM

## 2023-03-27 RX ORDER — BISACODYL 10 MG
10 SUPPOSITORY, RECTAL RECTAL DAILY PRN
Status: DISCONTINUED | OUTPATIENT
Start: 2023-03-27 | End: 2023-03-29 | Stop reason: HOSPADM

## 2023-03-27 RX ORDER — OXYTOCIN/0.9 % SODIUM CHLORIDE 30/500 ML
1-24 PLASTIC BAG, INJECTION (ML) INTRAVENOUS CONTINUOUS
Status: DISCONTINUED | OUTPATIENT
Start: 2023-03-27 | End: 2023-03-27 | Stop reason: HOSPADM

## 2023-03-27 RX ORDER — ONDANSETRON 4 MG/1
4 TABLET, ORALLY DISINTEGRATING ORAL EVERY 6 HOURS PRN
Status: DISCONTINUED | OUTPATIENT
Start: 2023-03-27 | End: 2023-03-27 | Stop reason: HOSPADM

## 2023-03-27 RX ADMIN — DOCUSATE SODIUM 100 MG: 100 CAPSULE, LIQUID FILLED ORAL at 18:59

## 2023-03-27 RX ADMIN — ACETAMINOPHEN 650 MG: 325 TABLET, FILM COATED ORAL at 18:59

## 2023-03-27 RX ADMIN — Medication 2 MILLI-UNITS/MIN: at 10:10

## 2023-03-27 RX ADMIN — SODIUM CHLORIDE, POTASSIUM CHLORIDE, SODIUM LACTATE AND CALCIUM CHLORIDE: 600; 310; 30; 20 INJECTION, SOLUTION INTRAVENOUS at 15:25

## 2023-03-27 RX ADMIN — Medication 12 ML/HR: at 15:13

## 2023-03-27 RX ADMIN — SODIUM CHLORIDE, POTASSIUM CHLORIDE, SODIUM LACTATE AND CALCIUM CHLORIDE: 600; 310; 30; 20 INJECTION, SOLUTION INTRAVENOUS at 10:10

## 2023-03-27 ASSESSMENT — ACTIVITIES OF DAILY LIVING (ADL)
ADLS_ACUITY_SCORE: 24
ADLS_ACUITY_SCORE: 24
ADLS_ACUITY_SCORE: 20
ADLS_ACUITY_SCORE: 24
ADLS_ACUITY_SCORE: 20

## 2023-03-27 NOTE — L&D DELIVERY NOTE
OB Vaginal Delivery Note    Bonnie Welch MRN# [id number]   Age: 43 year old YOB: 1980       GA: 39w3d  GP:   Labor Complications: None   EBL: 300  mL  Delivery QBL:    Delivery Type: Vaginal, Spontaneous   ROM to Delivery Time: (Delivered) Hours: 7 Minutes: 58  [place] Weight:     1 Minute 5 Minute 10 Minute   Apgar Totals: 8   9             Delivery Details:  Bonnie Welch, a 43 year old  female delivered a viable infant with apgars of 8  and 9 . Patient was fully dilated and pushing after   hours   minutes in active labor. Delivery was via vaginal, spontaneous  to a sterile field under epidural  anesthesia. Infant delivered in vertex  left  occiput  anterior  position. Anterior and posterior shoulders delivered without difficulty. The cord was clamped, cut twice and 3 vessels  were noted. Cord blood was obtained in routine fashion with the following disposition: lab .      Cord complications: none   Placenta delivered at 3/27/2023  5:17 PM . Placental disposition was Hospital disposal . Fundal massage performed and fundus found to be firm.     Episiotomy: none    Perineum, vagina, cervix were inspected, and the following lacerations were noted:   Perineal lacerations: 2nd                Any lacerations were repaired in the usual fashion using 3.0 monocryl.    Excellent hemostasis was noted. Needle count correct. Infant and patient in delivery room in good and stable condition.        Rebekah, Female-Bonnie [[id number]]    Labor Event Times    Labor onset date: 3/27/23 Onset time:  9:15 AM   Dilation complete date: 3/27/23 Complete time:  4:55 PM   Start pushing date/time: 3/27/2023 1706      Labor Events     labor?: No   steroids: None  Labor Type: Induction/Cervical ripening  Predominate monitoring during 1st stage: continuous electronic fetal monitoring     Antibiotics received during labor?: No     Rupture identifier: Sac 1  Rupture date/time:  3/27/23 0915   Rupture type: Artificial Rupture of Membranes  Fluid color: Bloody     Induction: AROM, Oxytocin  Induction date/time: 3/27/23 0915   Cervical ripening date/time:     Indications for induction: Elective     1:1 continuous labor support provided by?: RN Labor partogram used?: yes      Delivery/Placenta Date and Time    Delivery Date: 3/27/23 Delivery Time:  5:13 PM   Placenta Date/Time: 3/27/2023  5:17 PM  Oxytocin given at the time of delivery: after delivery of placenta  Delivering clinician: Laura Irizarry MD          Vaginal Counts     Initial count performed by 2 team members:  Two Team Members   Devante Irizarry MD       [place] Suture Needles Sponges (RETIRED) Instruments   Initial counts 0 0 5    Added to count       Relief counts       Final counts             Placed during labor Accounted for at the end of labor   FSE NA NA   IUPC NA NA   Cervidil NA NA                     Apgars    Living status: Living   1 Minute 5 Minute 10 Minute 15 Minute 20 Minute   Skin color: 0  1       Heart rate: 2  2       Reflex irritability: 2  2       Muscle tone: 2  2       Respiratory effort: 2  2       Total: 8  9       Apgars assigned by: OSMANI ALAMO RNC     Cord    Vessels: 3 Vessels    Cord Complications: None               Cord Blood Disposition: Lab    Gases Sent?: No    Delayed cord clamping?: Yes    Cord Clamping Delay (seconds):  seconds       [place] Resuscitation    Methods: None  Output in Delivery Room: Voided     [place] Measurements    Output in delivery room: Voided     Labor Events and Shoulder Dystocia    Fetal Tracing Prior to Delivery: Category 1  Shoulder dystocia present?: Neg     Delivery (Maternal) (Provider to Complete) ([id number])    Episiotomy: None  Perineal lacerations: 2nd Repaired?: Yes   Est. blood loss (mL): 300  Repair suture: 3-0 Monocryl  Genital tract inspection done: Pos     Blood Loss  Mother: Bonnie Welch #[id number]   Start of Mother's Information     Delivery Blood Loss  23 0915 - 23 1749    EBL (mL) Hospital Encounter 300 mL    Total  300 mL         End of Mother's Information  Mother: Bonnie Welch #[id number]          Delivery - Provider to Complete ([id number])    Delivering clinician: Laura Irizarry MD  Attempted Delivery Types (Choose all that apply): Spontaneous Vaginal Delivery  Delivery Type (Choose the 1 that will go to the Birth History): Vaginal, Spontaneous                                 Placenta    Date/Time: 3/27/2023  5:17 PM  Removal: Spontaneous  Disposition: Hospital disposal           Anesthesia    Method: Epidural  Cervical dilation at placement: 4-7                Presentation and Position    Presentation: Vertex    Position: Left Occiput Anterior                 Patient is 44 yo  @ 39 3/7 weeks presented for elective IOL. Underwent AROM, pitocin induction. Received epidural for pain management.  without complication. Midline 2nd degree perineal laceration repaired in standard fashion using 3.0 monocryl. Fundus firm after delivery. Mother and baby stable.   Laura Irizarry MD, MD

## 2023-03-27 NOTE — H&P
"  2023    Lele Dennis  [id number]            OB Admit History & Physical      Ms. Dennis  is here for elective IOL.    She has noticed more frequent contractions this am.    Patient's last menstrual period was 2022.   Her Estimated Date of Delivery: Mar 31, 2023  , making her 39w3d  wks.      Estimated body mass index is 22.66 kg/m  as calculated from the following:    Height as of this encounter: 1.626 m (5' 4\").    Weight as of this encounter: 59.9 kg (132 lb).  Her prenatal course has been  complicated by history of PTL and  birth.  She has been on IM progesterone from 16-36 weeks. Pregnancy course otherwise uncomplicated. U/S have shown appropriate growth.   See prenatal for labs.  Negative GBBS, Rubella immune Immune, RH positive    Estimated fetal weight= 7 lbs.        She is a 43 year old   Her OB history:   OB History    Para Term  AB Living   4 2 1 1 1 2   SAB IAB Ectopic Multiple Live Births   0 0 0 0 2      # Outcome Date GA Lbr Morales/2nd Weight Sex Delivery Anes PTL Lv   4 Current            3 Term 10/01/20 39w2d 03:20 / 00:46 2.91 kg (6 lb 6.7 oz) F Induction EPI  DARRION      Name: SONNY,FEMALE-LELE      Apgar1: 9  Apgar5: 9   2  19 34w0d 04:13 / 00:48 2.08 kg (4 lb 9.4 oz) M Vag-Spont EPI, Nitrous Y DARRION      Complications: Prolonged PROM (>18 hours),  premature rupture of membranes (PPROM) with unknown onset of labor      Name: JESSICA DENNIS      Apgar1: 6  Apgar5: 8   1 AB                     Past Medical History:   Diagnosis Date     Complication of anesthesia      Kidney stones      & 2013     Spider veins           Past Surgical History:   Procedure Laterality Date     CYSTOSCOPY, RETROGRADES, COMBINED  2022    Procedure: Cystoscopy, retrogrades, combined;  Surgeon: Carlos Otero MD;  Location: [facility] OR     LASER HOLMIUM LITHOTRIPSY URETER(S), INSERT STENT, COMBINED Left 2022    Procedure: " CYSTOSCOPY, LEFT URETEROSCPY, LEFT RETROGRADES, WITH HOLMIUM LASER LITHOTRIPSY,  LEFT URETERAL STENT INSERTION;  Surgeon: Carlos Otero MD;  Location:  OR         No current outpatient medications on file.       Allergies: Aspirin and Ibuprofen      REVIEW OF SYSTEMS:  NEUROLOGIC:  Negative  EYES:  Negative  ENT:  Negative  GI:  Negative  BREAST:  Negative  :  Negative  GYN:  Negative  CV:  Negative  PULMONARY:  Negative  MUSCULOSKELETAL:  Negative  PSYCH:  Negative        Social History     Socioeconomic History     Marital status:      Spouse name: Not on file     Number of children: Not on file     Years of education: Not on file     Highest education level: Not on file   Occupational History     Not on file   Tobacco Use     Smoking status: Never     Smokeless tobacco: Never   Vaping Use     Vaping Use: Never used   Substance and Sexual Activity     Alcohol use: Not Currently     Drug use: Never     Sexual activity: Yes     Partners: Male     Birth control/protection: None   Other Topics Concern     Not on file   Social History Narrative     Not on file     Social Determinants of Health     Financial Resource Strain: Not on file   Food Insecurity: Not on file   Transportation Needs: Not on file   Physical Activity: Not on file   Stress: Not on file   Social Connections: Not on file   Intimate Partner Violence: Not on file   Housing Stability: Not on file      Family History   Problem Relation Age of Onset     Heart Disease Mother              Vitals:     With contractions every  6-7 min    Alert Awake in NAD  HEENT grossly normal  Neck: no lymphadenopathy or thryoidomegaly  Lungs CTA bilaterally  Back  spinal or CVAT  Heart RRR  ABD gravid, nontender on exam with vtx palpable  Pelvic:  scant fluid noted, no blood noted  Cervix is 4-5 cm / 50 % effaced at -2 station, cx slightly posterior with vtx well applied  EXT:  No  edema or calf tenderness  Neuro:  Normal exam    Assessment:  IUP  at 39w3d  42 yo  here for elective IOL, favorable cervix    Plan:  AROM with scant fluid noted.  Begin pitocin augmentation.  Patient may have epidural when desired.  Laura Irizarry MD, MD on 3/27/2023 at 9:30 AM      [unfilled]      Laura Irizarry MD, MD MD  Dept of OB/GYN  2023

## 2023-03-27 NOTE — PROGRESS NOTES
Data: Patient admitted to room 233 at 0745. Patient is a . Prenatal record reviewed.   OB History    Para Term  AB Living   4 2 1 1 1 2   SAB IAB Ectopic Multiple Live Births   0 0 0 0 2      # Outcome Date GA Lbr Morales/2nd Weight Sex Delivery Anes PTL Lv   4 Current            3 Term 10/01/20 39w2d 03:20 / 00:46 2.91 kg (6 lb 6.7 oz) F Induction EPI  DARRION      Name: MANINDER DENNIS-LELE      Apgar1: 9  Apgar5: 9   2  19 34w0d 04:13  00:48 2.08 kg (4 lb 9.4 oz) M Vag-Spont EPI, Nitrous Y DARRION      Complications: Prolonged PROM (>18 hours),  premature rupture of membranes (PPROM) with unknown onset of labor      Name: JESSICA DENNIS      Apgar1: 6  Apgar5: 8   1 AB            .  Medical History:   Past Medical History:   Diagnosis Date     Complication of anesthesia      Kidney stones      &      Spider veins    .  Gestational age 39w3d. Vital signs per doc flowsheet. Fetal movement present. Patient reports Induction Of Labor   as reason for admission. Support persons Thor,  present.  Action: RN obtained at 0745. Care of patient assumed at 0745. Verbal consent for EFM, external fetal monitors applied. Admission assessment completed. Patient and support persons educated on labor process. Patient instructed to report change in fetal movement, contractions, vaginal leaking of fluid or bleeding, abdominal pain, or any concerns related to the pregnancy to her nurse/physician. Patient oriented to room, call light in reach.   Response: Dr. Irizarry informed of arrival. Plan per provider is MD to come to bedside for SVE, AROM, labor orders received with plan for CBC, T&S. Patient verbalized understanding of education and verbalized agreement with plan. Patient coping with labor.

## 2023-03-27 NOTE — PROGRESS NOTES
"OB  Patient is comfortable with epidural, having some pressure with contractions.    /70   Temp 97.9  F (36.6  C) (Temporal)   Resp 18   Ht 1.626 m (5' 4\")   Wt 59.9 kg (132 lb)   LMP 2022   SpO2 99%   Breastfeeding No   BMI 22.66 kg/m      Cx: C/100/+2  FHT baseline 140, moderate variability, accels, cat 1  Ctxns q2-3\", pitocin @10 mu    A/P: 44 yo  @ 39 3/7 weeks, elective IOL, GBS neg  Patient to begin pushing now.  Anticipate .  Laura Irizarry MD, MD on 3/27/2023 at 5:01 PM    "

## 2023-03-27 NOTE — ANESTHESIA PROCEDURE NOTES
Epidural catheter Procedure Note    Pre-Procedure   Staff -        Anesthesiologist:  Catrachito Diop MD       Performed By: anesthesiologist       Location: OB       Pre-Anesthestic Checklist: patient identified, IV checked, site marked, risks and benefits discussed, informed consent, monitors and equipment checked, pre-op evaluation and at physician/surgeon's request  Timeout:       Correct Patient: Yes        Correct Procedure: Yes        Correct Site: Yes        Correct Position: Yes   Procedure Documentation  Procedure: epidural catheter       Patient Position: sitting       Patient Prep/Sterile Barriers: sterile gloves, mask, patient draped       Skin prep: Betadine       Local skin infiltrated with 1 mL of 1% lidocaine.        Insertion Site: L3-4. (midline approach).       Technique: LORT saline        Needle Type: Touhy needle       Needle Gauge: 17.        Needle Length (Inches): 3.5        Catheter: 19 G.          Catheter threaded easily.             # of attempts: 1 and  # of redirects:     Assessment/Narrative         Paresthesias: No.       Test dose of 3 mL lidocaine 1.5% w/ 1:200,000 epinephrine at 15:18 CDT.         Test dose negative, 3 minutes after injection, for signs of intravascular, subdural, or intrathecal injection.       Insertion/Infusion Method: LORT saline       Aspiration negative for Heme or CSF via Epidural Catheter.    Medication(s) Administered   0.125% Bupivacaine + 2 mcg/mL Fentanyl via CADD (Epidural) - EPIDURAL   8 mL - 3/27/2023 3:20:00 PM   Comments:  Pre-procedure time out completed. Patient in sitting position, the lumbar spine was prepped and draped in sterile fashion. The L3/L4 interspace was identified and local anesthetic was injected for local skin infiltration. A 17 G touhy needle was advanced to the epidural space which was confirmed with the loss of resistance technique at 4 cm. A catheter was then advanced easily into the epidural space. The catheter was  "left at 8 cm at the skin. Negative aspiration of blood and CSF was confirmed. A test dose of 1.5% lidocaine with 1:200,000 epinephrine was injected through the catheter and was negative for intravascular injection. The site was covered with sterile tegaderm and the catheter was secured with tape.       FOR [facility]:   Pain Team Contact information: please page the Pain Team Via [employer]. Search \"Pain\". During daytime hours, please page the attending first. At night please page the resident first.    "

## 2023-03-28 PROCEDURE — 120N000012 HC R&B POSTPARTUM

## 2023-03-28 PROCEDURE — 250N000013 HC RX MED GY IP 250 OP 250 PS 637: Performed by: OBSTETRICS & GYNECOLOGY

## 2023-03-28 RX ADMIN — DOCUSATE SODIUM 100 MG: 100 CAPSULE, LIQUID FILLED ORAL at 07:47

## 2023-03-28 RX ADMIN — ACETAMINOPHEN 650 MG: 325 TABLET, FILM COATED ORAL at 14:09

## 2023-03-28 RX ADMIN — ACETAMINOPHEN 650 MG: 325 TABLET, FILM COATED ORAL at 00:45

## 2023-03-28 RX ADMIN — ACETAMINOPHEN 650 MG: 325 TABLET, FILM COATED ORAL at 10:35

## 2023-03-28 RX ADMIN — ACETAMINOPHEN 650 MG: 325 TABLET, FILM COATED ORAL at 19:36

## 2023-03-28 RX ADMIN — BENZOCAINE: 11.4 AEROSOL, SPRAY TOPICAL at 08:01

## 2023-03-28 RX ADMIN — ACETAMINOPHEN 650 MG: 325 TABLET, FILM COATED ORAL at 06:30

## 2023-03-28 ASSESSMENT — ACTIVITIES OF DAILY LIVING (ADL)
ADLS_ACUITY_SCORE: 20

## 2023-03-28 NOTE — PROGRESS NOTES
"OB Progress Note  Postpartum Day 1: Vaginal Delivery    Subjective:  The patient is doing well, no complaints. She is ambulating, voiding, and tolerating a regular diet. Lochia is mild. The baby is doing well, breastfeeding.     Objective:  /81 (BP Location: Right arm, Patient Position: Semi-Chance's, Cuff Size: Adult Regular)   Pulse 58   Temp 97.7  F (36.5  C) (Oral)   Resp 16   Ht 1.626 m (5' 4\")   Wt 59.9 kg (132 lb)   LMP 2022   SpO2 100%   Breastfeeding Unknown   BMI 22.66 kg/m        Intake/Output Summary (Last 24 hours) at 3/28/2023 1157  Last data filed at 3/27/2023 1713  Gross per 24 hour   Intake --   Output 600 ml   Net -600 ml       Gen: AAOx3. NAD.  Mood: stable  Abd: soft, nondistended. Fundus firm.   Ext: No pitting edema. Neg homans sign.     Assessment:  43 year old yo   s/p vaginal delivery, now PPD1. Doing well.   Rh pos/RI/GBS negative    Plan:  1) Routine postpartum care  2) Anticipate discharge home tomorrow    Johnny Ramires DO on 3/28/2023 at3:38 PM   "

## 2023-03-28 NOTE — PLAN OF CARE
Vital signs and assessments within normal limits. Patient is up independently, voiding adequately, pain well controlled with tylenol PO.  Using ice packs, tucks, and benzocaine spray to perineum for comfort.  Encouraged to continue to ambulate as able and void frequently.  Patient is bonding well with infant.

## 2023-03-28 NOTE — PLAN OF CARE
Pt transferred to room 426 via wheelchair accompanied by spouse and LISY Garcia. Infant transferred via mother's arms. Infant placed in bassinet, ID bands double checked and verified. Belongings placed in room, patient and spouse oriented to surroundings. Pt and infant stable.

## 2023-03-28 NOTE — PLAN OF CARE
Pt's VSS, up ambulating independently, voiding adequately, fundus firm, scant lochia. Pain managed tylenol, no ibuprofen due to allergy. Pt breastfeeding infant well, spouse at bedside and is supportive to both infant and pt. No concerns overnight. Utilizing heat for uterine cramping while feeding infant.

## 2023-03-28 NOTE — PLAN OF CARE
Report from RN and care assumed. Pts vitals stable. Pt stated that her right leg was still hard to lift. Pt breastfeeding. NO requests or questions at this time. Will take pt up to mother baby after her leg sensation returns.

## 2023-03-28 NOTE — LACTATION NOTE
Lactation visit with Bonnie, JACQUI, and baby girl.    This is Bonnie's third baby, she said breastfeeding was going well and she politely declined a full lactation visit.    Danielle Carter RN IBCLC

## 2023-03-28 NOTE — PLAN OF CARE
Pt brought to bathroom via stedy and cleaned up. Loli care done. Epidural cath removed. Small amount of bleeding at site. Placed a 2x2 and Band-Aid over the site. Pt then brought to wheel chair and handed the baby. S/O gathered all of belongings. Brought pt up to 426 with infant in arms and s/o with belongings. Report given to RN

## 2023-03-28 NOTE — ANESTHESIA POSTPROCEDURE EVALUATION
Patient: Bonnie Welch    Procedure: * No procedures listed *       Anesthesia Type:  Epidural    Note:  Disposition: Inpatient   Postop Pain Control: Uneventful            Sign Out: Well controlled pain   PONV: No   Neuro/Psych: Uneventful            Sign Out: Acceptable/Baseline neuro status   Airway/Respiratory: Uneventful            Sign Out: Acceptable/Baseline resp. status   CV/Hemodynamics: Uneventful            Sign Out: Acceptable CV status   Other NRE: NONE   DID A NON-ROUTINE EVENT OCCUR? No    Event details/Postop Comments:  Patient doing well.  Ambulating without difficulty. Denies residual numbness/weakness.  No complaints/concerns.           Last vitals:  Vitals:    03/27/23 2044 03/28/23 0109 03/28/23 0745   BP: 129/82 137/85 125/81   Pulse: 69 58    Resp: 16 16 16   Temp:  37  C (98.6  F) 36.5  C (97.7  F)   SpO2:          Electronically Signed By: Catrachito Diop MD  March 28, 2023  5:35 PM

## 2023-03-29 VITALS
DIASTOLIC BLOOD PRESSURE: 80 MMHG | BODY MASS INDEX: 22.53 KG/M2 | SYSTOLIC BLOOD PRESSURE: 120 MMHG | OXYGEN SATURATION: 100 % | RESPIRATION RATE: 18 BRPM | TEMPERATURE: 97.6 F | HEIGHT: 64 IN | HEART RATE: 110 BPM | WEIGHT: 132 LBS

## 2023-03-29 PROCEDURE — 250N000013 HC RX MED GY IP 250 OP 250 PS 637: Performed by: OBSTETRICS & GYNECOLOGY

## 2023-03-29 RX ORDER — ACETAMINOPHEN 325 MG/1
325-650 TABLET ORAL EVERY 6 HOURS PRN
Start: 2023-03-29

## 2023-03-29 RX ADMIN — DOCUSATE SODIUM 100 MG: 100 CAPSULE, LIQUID FILLED ORAL at 09:31

## 2023-03-29 RX ADMIN — ACETAMINOPHEN 650 MG: 325 TABLET, FILM COATED ORAL at 09:31

## 2023-03-29 RX ADMIN — ACETAMINOPHEN 650 MG: 325 TABLET, FILM COATED ORAL at 02:22

## 2023-03-29 ASSESSMENT — ACTIVITIES OF DAILY LIVING (ADL)
ADLS_ACUITY_SCORE: 20

## 2023-03-29 NOTE — PROGRESS NOTES
"OB Progress Note  Postpartum Day 2: Vaginal Delivery    Subjective:  The patient is doing well, no complaints. She is ambulating, voiding, and tolerating a regular diet. Lochia is mild. The baby is doing well, breastfeeding.     Objective:  /80   Pulse (!) 122   Temp 97.6  F (36.4  C) (Oral)   Resp 18   Ht 1.626 m (5' 4\")   Wt 59.9 kg (132 lb)   LMP 2022   SpO2 100%   Breastfeeding Unknown   BMI 22.66 kg/m        Intake/Output Summary (Last 24 hours) at 3/28/2023 1157  Last data filed at 3/27/2023 1713  Gross per 24 hour   Intake --   Output 600 ml   Net -600 ml       Gen: AAOx3. NAD.  Mood: stable  Abd: soft, nondistended. Fundus firm.   Ext: No pitting edema. Neg homans sign.     Assessment:  43 year old yo   s/p vaginal delivery, now PPD2. Doing well.   Rh pos/RI/GBS negative    Plan:  1) Routine postpartum care  2) Anticipate discharge home today. Precautions and instructions reviewed at bedside.     Johnny Ramires,  on 3/29/2023 at 8:48 AM   "

## 2023-03-29 NOTE — PLAN OF CARE
Pt's VSS, up ambulating independently, voiding adequately, fundus firm, scant lochia. Pain managed with tylenol. Pt breastfeeding infant well, spouse at bedside for support.

## 2023-03-29 NOTE — PLAN OF CARE
VSS and fundus firm.  Patient states pain adequately managed with PRN pain medications.  Patient complained of slight dizziness when getting up from the chair that resolved.  Patient had not eaten in about 3 hours.  Gave patient apple juice and offered snack.  Patient drank juice and declined snack, said she would eat lunch at home and they don't live far from the hospital.  Patient stated she felt better after the juice.  Discharge instructions, self care, and reasons to call doctor reviewed with patient.  Patient verbalizes understanding to make follow-up appointment as directed by physician.  Patient states no questions with discharge.  Hug and Kisses tags removed.

## 2023-03-29 NOTE — LACTATION NOTE
Discharge visit with Mother and Father and baby girl.  Mother states breast feeding is going well and that she is latching on well.  Baby at breast at time of visit. Mother comfortably holding and positioning baby.   Baby girl tolerates feeding well.      Appreciative of visit.  No further questions at this time.   Reviewed follow up with outpatient lactation consultant in pediatrician clinic as needed.    Daria Oneill RN, IBCLC

## 2023-03-29 NOTE — DISCHARGE SUMMARY
OBN DISCHARGE SUMMARY    Bonnie Welch  1980  [id number]    Admission date: 3/27/2023  Discharge date: 3/29/2023    Admission Diagnosis: Term Pregnancy at 39w3d  Discharge Diagnosis:  S/p     Summary of Hospital Stay:  Bonnie Welch is a 43 year old  at 39w3d who was admitted to [facility] for Elective IOL.  She delivered a viable female infant with apgars of 8  and 9 .   She underwent an uncomplicated vaginal delivery. She had a 2nd degree perineal laceration.   She delivered a female infant on 23. Apgars 8/9. Weight 2740g.     Her postpartum course was uncomplicated.   She was discharged home on PPD2 in stable condition.        Review of your medicines      START taking      Dose / Directions   acetaminophen 325 MG tablet  Commonly known as: TYLENOL  Used for: Normal vaginal delivery      Dose: 325-650 mg  Take 1-2 tablets (325-650 mg) by mouth every 6 hours as needed for mild pain  Refills: 0        CONTINUE these medicines which have NOT CHANGED      Dose / Directions   calcium-magnesium 500-250 MG Tabs per tablet  Commonly known as: CALMAG      Dose: 1 tablet  Take 1 tablet by mouth daily  Refills: 0     multivitamin w/minerals tablet      Dose: 1 tablet  Take 1 tablet by mouth daily  Refills: 0     Vitamin C 500 MG Caps      Dose: 1,000 mg  Take 1,000 mg by mouth  Refills: 0     Vitamin D (Cholecalciferol) 25 MCG (1000 UT) Caps      Refills: 0            Johnny Ramires,  on 3/29/2023 at 9:36 AM

## 2023-06-04 ENCOUNTER — HEALTH MAINTENANCE LETTER (OUTPATIENT)
Age: 43
End: 2023-06-04

## 2023-07-24 NOTE — ANESTHESIA PREPROCEDURE EVALUATION
160.02 Anesthesia Pre-Procedure Evaluation    Patient: Bonnie Welch   MRN: [id number] : 1980        Procedure :           Past Medical History:   Diagnosis Date     Complication of anesthesia      Kidney stones      &      Spider veins       Past Surgical History:   Procedure Laterality Date     CYSTOSCOPY, RETROGRADES, COMBINED  2022    Procedure: Cystoscopy, retrogrades, combined;  Surgeon: Carlos Otero MD;  Location: [facility] OR     LASER HOLMIUM LITHOTRIPSY URETER(S), INSERT STENT, COMBINED Left 2022    Procedure: CYSTOSCOPY, LEFT URETEROSCPY, LEFT RETROGRADES, WITH HOLMIUM LASER LITHOTRIPSY,  LEFT URETERAL STENT INSERTION;  Surgeon: Carlos Otero MD;  Location: [facility] OR      Allergies   Allergen Reactions     Aspirin      Ibuprofen       Social History     Tobacco Use     Smoking status: Never     Smokeless tobacco: Never   Substance Use Topics     Alcohol use: Not Currently      Wt Readings from Last 1 Encounters:   23 59.9 kg (132 lb)        Anesthesia Evaluation            ROS/MED HX  ENT/Pulmonary:    (-) asthma   Neurologic:  - neg neurologic ROS     Cardiovascular:    (-) PIH   METS/Exercise Tolerance:     Hematologic:     (+) no anticoagulation therapy, no coagulopathy,     Musculoskeletal:       GI/Hepatic:     (+) GERD,     Renal/Genitourinary:       Endo:       Psychiatric/Substance Use:       Infectious Disease:       Malignancy:       Other:            Physical Exam    Airway        Mallampati: II   TM distance: > 3 FB   Neck ROM: full     Respiratory Devices and Support         Dental  no notable dental history         Cardiovascular   cardiovascular exam normal          Pulmonary   pulmonary exam normal                OUTSIDE LABS:  CBC:   Lab Results   Component Value Date    WBC 8.6 2023    WBC 6.0 2022    HGB 14.2 2023    HGB 14.4 2022    HCT 41.1 2023    HCT 42.3 2022     2023     (H)  05/08/2022     BMP:   Lab Results   Component Value Date     05/08/2022    POTASSIUM 4.1 05/08/2022    CHLORIDE 105 05/08/2022    CO2 25 05/08/2022    BUN 9 05/08/2022    CR 0.66 05/08/2022    GLC 97 05/08/2022     COAGS: No results found for: PTT, INR, FIBR  POC:   Lab Results   Component Value Date    HCG Negative 05/12/2022    HCGS Negative 05/08/2022     HEPATIC:   Lab Results   Component Value Date    ALBUMIN 4.0 05/08/2022    PROTTOTAL 7.5 05/08/2022    ALT 16 05/08/2022    AST 12 05/08/2022    ALKPHOS 62 05/08/2022    BILITOTAL 0.6 05/08/2022     OTHER:   Lab Results   Component Value Date    CARLY 8.7 05/08/2022    LIPASE 60 (L) 05/08/2022       Anesthesia Plan    ASA Status:  2      Anesthesia Type: Epidural.              Consents    Anesthesia Plan(s) and associated risks, benefits, and realistic alternatives discussed. Questions answered and patient/representative(s) expressed understanding.    - Discussed:     - Discussed with:  Patient         Postoperative Care            Comments:    Other Comments: Orders to manage the epidural infusion have been entered, and through coordination with the nurse, we will continute to manage and monitor the patient's labor epidural.  We will continuously be available to adjust as needed thruout the entire L&D process.             Catrachito Diop MD

## 2024-02-25 ENCOUNTER — HEALTH MAINTENANCE LETTER (OUTPATIENT)
Age: 44
End: 2024-02-25

## 2024-07-14 ENCOUNTER — HEALTH MAINTENANCE LETTER (OUTPATIENT)
Age: 44
End: 2024-07-14

## 2025-07-19 ENCOUNTER — HEALTH MAINTENANCE LETTER (OUTPATIENT)
Age: 45
End: 2025-07-19

## (undated) DEVICE — CATH URETERAL FLEX TIP TIGERTAIL 06FRX70CM 139006

## (undated) DEVICE — BASKET NITINOL TIPLESS HALO  1.5FRX120CM 554120

## (undated) DEVICE — BAG DRAIN URO FOR SIEMENS 8MM ADAPTER NS CC164NS-A

## (undated) DEVICE — GLOVE PROTEXIS W/NEU-THERA 7.5  2D73TE75

## (undated) DEVICE — PAD CHUX UNDERPAD 23X24" 7136

## (undated) DEVICE — RAD RX ISOVUE 300 (50ML) 61% IOPAMIDOL CHARGE PER ML

## (undated) DEVICE — LASER FIBER FLEXIVA PULSE TRACTIP 242 SGL USE M006L8405960

## (undated) DEVICE — SOL WATER IRRIG 3000ML BAG 2B7117

## (undated) DEVICE — GUIDEWIRE URO STR STIFF .035"X150CM NITINOL 150NSS35

## (undated) DEVICE — SHEATH URETERAL ACCESS NAVIGATOR HD 12/14FRX36CM M0062502250

## (undated) DEVICE — PACK CYSTOSCOPY SBA15CYFSI

## (undated) DEVICE — SOL WATER IRRIG 1000ML BOTTLE 2F7114

## (undated) DEVICE — SHEATH URETERAL ACCESS NAVIGATOR HD 13/15FRX46CM M0062502290

## (undated) RX ORDER — ATROPA BELLADONNA AND OPIUM 16.2; 3 MG/1; MG/1
SUPPOSITORY RECTAL
Status: DISPENSED
Start: 2022-05-12

## (undated) RX ORDER — FENTANYL CITRATE 0.05 MG/ML
INJECTION, SOLUTION INTRAMUSCULAR; INTRAVENOUS
Status: DISPENSED
Start: 2022-05-12

## (undated) RX ORDER — LIDOCAINE HYDROCHLORIDE 20 MG/ML
INJECTION, SOLUTION EPIDURAL; INFILTRATION; INTRACAUDAL; PERINEURAL
Status: DISPENSED
Start: 2022-05-12

## (undated) RX ORDER — HYDROMORPHONE HCL IN WATER/PF 6 MG/30 ML
PATIENT CONTROLLED ANALGESIA SYRINGE INTRAVENOUS
Status: DISPENSED
Start: 2022-05-12

## (undated) RX ORDER — ONDANSETRON 2 MG/ML
INJECTION INTRAMUSCULAR; INTRAVENOUS
Status: DISPENSED
Start: 2022-05-12

## (undated) RX ORDER — CEFAZOLIN SODIUM/WATER 2 G/20 ML
SYRINGE (ML) INTRAVENOUS
Status: DISPENSED
Start: 2022-05-12

## (undated) RX ORDER — PROPOFOL 10 MG/ML
INJECTION, EMULSION INTRAVENOUS
Status: DISPENSED
Start: 2022-05-12

## (undated) RX ORDER — DEXAMETHASONE SODIUM PHOSPHATE 4 MG/ML
INJECTION, SOLUTION INTRA-ARTICULAR; INTRALESIONAL; INTRAMUSCULAR; INTRAVENOUS; SOFT TISSUE
Status: DISPENSED
Start: 2022-05-12

## (undated) RX ORDER — OXYCODONE HYDROCHLORIDE 5 MG/1
TABLET ORAL
Status: DISPENSED
Start: 2022-05-12

## (undated) RX ORDER — FENTANYL CITRATE 50 UG/ML
INJECTION, SOLUTION INTRAMUSCULAR; INTRAVENOUS
Status: DISPENSED
Start: 2022-05-12